# Patient Record
Sex: MALE | Race: BLACK OR AFRICAN AMERICAN | NOT HISPANIC OR LATINO | Employment: OTHER | URBAN - METROPOLITAN AREA
[De-identification: names, ages, dates, MRNs, and addresses within clinical notes are randomized per-mention and may not be internally consistent; named-entity substitution may affect disease eponyms.]

---

## 2017-02-08 ENCOUNTER — TELEPHONE (OUTPATIENT)
Dept: FAMILY MEDICINE | Facility: CLINIC | Age: 62
End: 2017-02-08

## 2017-03-10 ENCOUNTER — OFFICE VISIT (OUTPATIENT)
Dept: ENDOCRINOLOGY | Facility: CLINIC | Age: 62
End: 2017-03-10
Payer: MEDICAID

## 2017-03-10 VITALS
HEART RATE: 72 BPM | HEIGHT: 70 IN | WEIGHT: 213.31 LBS | BODY MASS INDEX: 30.54 KG/M2 | SYSTOLIC BLOOD PRESSURE: 110 MMHG | DIASTOLIC BLOOD PRESSURE: 72 MMHG

## 2017-03-10 DIAGNOSIS — D57.1 HB-SS DISEASE WITHOUT CRISIS: ICD-10-CM

## 2017-03-10 DIAGNOSIS — E66.9 OBESITY (BMI 30-39.9): ICD-10-CM

## 2017-03-10 PROCEDURE — 99213 OFFICE O/P EST LOW 20 MIN: CPT | Mod: PBBFAC,PO | Performed by: NURSE PRACTITIONER

## 2017-03-10 PROCEDURE — 99214 OFFICE O/P EST MOD 30 MIN: CPT | Mod: S$PBB,,, | Performed by: NURSE PRACTITIONER

## 2017-03-10 PROCEDURE — 99999 PR PBB SHADOW E&M-EST. PATIENT-LVL III: CPT | Mod: PBBFAC,,, | Performed by: NURSE PRACTITIONER

## 2017-03-10 RX ORDER — INSULIN PUMP SYRINGE, 3 ML
EACH MISCELLANEOUS
Qty: 1 EACH | Refills: 0 | Status: SHIPPED | OUTPATIENT
Start: 2017-03-10 | End: 2023-02-27

## 2017-03-10 RX ORDER — LANCETS
EACH MISCELLANEOUS
Qty: 200 EACH | Refills: 3 | Status: SHIPPED | OUTPATIENT
Start: 2017-03-10 | End: 2018-04-09 | Stop reason: SDUPTHER

## 2017-03-10 NOTE — PROGRESS NOTES
"CC: Mr. Royer Motley arrives today for management of Type 2 DM and review of chronic medical conditions, as listed in the visit diagnosis section of this encounter.     HPI: Mr. Royer Motley was diagnosed with Type 2 DM in 2004 during a hospitalization for a sickle cell crisis and metformin. He started taking Lantus and Novolog insulin in 2012 after an admission for DKA and SCC.  + FH of DM in both parents, 2 sisters.     Last seen in endocrine by CRUZITO Schaefer NP, in 12/2016. He reports being non compliant at the time of last visit, but now is taking his metformin as prescribed. He is currently holding his glimepiride due to better blood sugars.   He is new to me today.    He is accompanied by his wife, who provides most of the history.     BG readings are checked 3x/day. No logs to clinic. Reports most BG range 90-140s.    Hypoglycemia: No    Missing Insulin/PO medication doses: No    Exercise: Yes, gym 3x/week. Bike rides 2 days/week.    Dietary Habits: Eats 2-3 meals/day, occasionally skips lunch. Has cut out cookies. Drinks 2 glasses juice per day. .    Last DM education appointment: 9/2016      CURRENT DIABETIC MEDS: Glucophage XR 1000 mg BID  Glucometer type: brotips meter? He is asking for a new meter today.     Previous DM treatments:  Glimepiride  Lantus  Novolog    Last Eye Exam: never  Last Podiatry Exam: never    REVIEW OF SYSTEMS  Constitutional: no c/o fatigue, weakness, or weight loss.   Eyes: + blurry vision that waxes and wanes  Cardiac: no palpitations or chest pain.  Respiratory: no cough or dyspnea.  GI: no c/o abdominal pain or nausea. Denies h/o pancreatitis.   Skin: no lesions or rashes.  Neuro: no numbness, tingling, or parasthesias.  Endocrine: denies polyphagia, polydipsia, polyuria.      Personally reviewed Past Medical, Surgical, Social History.    Vital Signs  /72 (BP Location: Left arm, Patient Position: Sitting, BP Method: Manual)  Pulse 72  Ht 5' 10" (1.778 m)  Wt " 96.7 kg (213 lb 4.7 oz)  BMI 30.6 kg/m2    Personally reviewed the below labs:    No results found for: HGBA1C    Chemistry        Component Value Date/Time     09/09/2016 1113    K 4.7 09/09/2016 1113     09/09/2016 1113    CO2 24 09/09/2016 1113    BUN 15 09/09/2016 1113    CREATININE 1.5 (H) 09/09/2016 1113     (H) 09/09/2016 1113        Component Value Date/Time    CALCIUM 8.7 09/09/2016 1113    ALKPHOS 103 09/09/2016 1113    AST 17 09/09/2016 1113    ALT 13 09/09/2016 1113    BILITOT 2.3 (H) 09/09/2016 1113          Lab Results   Component Value Date    CHOL 115 (L) 09/09/2016    CHOL 101 (L) 10/14/2011     Lab Results   Component Value Date    HDL 30 (L) 09/09/2016    HDL 19 (L) 10/14/2011     Lab Results   Component Value Date    LDLCALC 60.0 (L) 09/09/2016    LDLCALC 45.2 (L) 10/14/2011     Lab Results   Component Value Date    TRIG 125 09/09/2016    TRIG 184 (H) 10/14/2011     Lab Results   Component Value Date    CHOLHDL 26.1 09/09/2016    CHOLHDL 18.8 (L) 10/14/2011       Lab Results   Component Value Date    MICALBCREAT 5.3 09/09/2016     Lab Results   Component Value Date    TSH 0.326 (L) 10/13/2011       CrCl cannot be calculated (Patient has no serum creatinine result on file.).    No results found for: [id number]      PHYSICAL EXAMINATION  Constitutional: Appears well, no distress  Neck: Supple, trachea midline; no thyromegaly or nodules.   Respiratory: CTA, even and unlabored.  Cardiovascular: RRR, no murmurs, no carotid bruits. DP pulses  2+ bilaterally; no edema.    Lymph: no cervical or supraclavicular lymphadenopathy  Skin: warm and dry; no lipohypertrophy, + acanthosis nigracans observed.  Neuro: no loss of protective sensation via 10 gm monofilament or vibratory exam bilaterally, DTR 2+ BUE/2+BLE.  Feet: no open wounds or callouses; appropriate footwear.    Assessment/Plan  1. Uncontrolled type 2 diabetes mellitus with diabetic polyneuropathy, without long-term current  use of insulin  -- No recent labs but pt reports being more compliant and not skipping doses of his metformin.   -- continue metformin  -- check BG 2x/day. Rx for meter, strips, lancets sent to pharmacy  -- needs to cut back on juice intake. Limit to only 6 oz/day.  Ambulatory referral to Ophthalmology    -- will order fructosamine to evaluate BG average. A1c not accurate with hemoglobinopathy    -- Discussed diagnosis of DM, A1c goals, progression of disease, long term complications and tx options.  Advised patient to check BG before activities, such as driving or exercise.  -- Reviewed hypoglycemia management: treat with 1/2 glass of juice, 1/2 can regular coke, or 4 glucose tablets. Monitor and repeat treatment every 15 minutes until BG is >70 Then have a snack, which includes a complex carbohydrate and protein.   2. Hb-SS disease without crisis  -- will avoid A1c and order fructosamine     3. Obesity (BMI 30-39.9)  -- increases insulin resistance        FOLLOW UP  Return in about 3 months (around 6/10/2017).   Patient instructed to bring BG logs to each follow up   Patient encouraged to call for any BG/medication issues, concerns, or questions.    Orders Placed This Encounter   Procedures    Basic metabolic panel    Fructosamine    Ambulatory referral to Ophthalmology

## 2017-03-10 NOTE — MR AVS SNAPSHOT
[facility]  [address]  [place] [address]  Phone: [phone]  Fax: [phone]                  Royer Motley   3/10/2017 1:00 PM   Office Visit    Description:  Male : 1955   Provider:  VERONIKA Casey FNP-C   Department:  [facility]           Reason for Visit     Diabetes           Diagnoses this Visit        Comments    Uncontrolled type 2 diabetes mellitus with diabetic polyneuropathy, without long-term current use of insulin    -  Primary     Hb-SS disease without crisis         Obesity (BMI 30-39.9)                To Do List           Future Appointments        Provider Department Dept Phone    2017 9:00 AM Ginger Hathaway MD [facility] - Ophthalmology [phone]    2017 10:05 AM LAB, [place] [facility] [phone]    2017 1:00 PM VERONIKA Casey FNP-C [facility] [phone]      Goals (5 Years of Data)     None      Follow-Up and Disposition     Return in about 3 months (around 6/10/2017).       These Medications        Disp Refills Start End    blood-glucose meter kit 1 each 0 3/10/2017 3/10/2018    Use as instructed    Pharmacy: [facility] [id number] [facility], [place] - [address] AT [address] [address] Ph #: [phone]       blood sugar diagnostic Strp 200 strip 3 3/10/2017     Check blood glucose 2 times a day. E11.42    Pharmacy: [facility] [id number]  [facility] [place] [facility] [address] [facility] [address] Ph #: [phone]       lancets Misc 200 each 3 3/10/2017     Check blood glucose 2x/day. e11.42    Pharmacy: [facility] [id number]  [facility] [place] [facility] [address] [facility] [address] Ph #: [phone]         [facility]     [facility] -  Assistance  Registered nurses in the [facility] provide clinical advisement, health  education, appointment booking, and other advisory services.  Call for this free service at [phone].             Medications           Message regarding Medications     Verify the changes and/or additions to your medication regime listed below are the same as discussed with your clinician today.  If any of these changes or additions are incorrect, please notify your healthcare provider.        CHANGE how you are taking these medications     Start Taking Instead of    blood sugar diagnostic Strp blood sugar diagnostic Strp    Dosage:  Check blood glucose 2 times a day. E11.42 Dosage:  Check blood glucose 4 times a day. E11.65    Reason for Change:  Reorder     lancets Misc lancets Misc    Dosage:  Check blood glucose 2x/day. e11.42 Dosage:  Check blood glucose 4x/day. e11.65    Reason for Change:  Reorder       STOP taking these medications     glimepiride (AMARYL) 4 MG tablet Take 1 tablet (4 mg total) by mouth before breakfast.           Verify that the below list of medications is an accurate representation of the medications you are currently taking.  If none reported, the list may be blank. If incorrect, please contact your healthcare provider. Carry this list with you in case of emergency.           Current Medications     blood sugar diagnostic Strp Check blood glucose 2 times a day. E11.42    blood-glucose meter kit Use as instructed    doxazosin (CARDURA) 8 MG Tab 8 mg every 12 (twelve) hours.     gabapentin (NEURONTIN) 300 MG capsule Take 1 capsule (300 mg total) by mouth every evening.    lancets Misc Check blood glucose 2x/day. e11.42    metformin (GLUCOPHAGE-XR) 500 MG 24 hr tablet Take 2 tablets (1,000 mg total) by mouth 2 (two) times daily with meals.    morphine (MS CONTIN) 15 MG 12 hr tablet Take 15 mg by mouth once daily.    oxybutynin (DITROPAN) 5 MG Tab TK 1 T PO  AT NIGHT TIME    oxycodone (ROXICODONE) 30 MG Tab TAKE 1 TABLET BY MOUTH 4 TIMES DAILY AS NEEDED for breakthrough pain          "  Clinical Reference Information           Your Vitals Were     BP Pulse Height Weight BMI    110/72 (BP Location: Left arm, Patient Position: Sitting, BP Method: Manual) 72 5' 10" (1.778 m) 96.7 kg (213 lb 4.7 oz) 30.6 kg/m2      Blood Pressure          Most Recent Value    BP  110/72      Allergies as of 3/10/2017     No Known Allergies      Immunizations Administered on Date of Encounter - 3/10/2017     None      Orders Placed During Today's Visit      Normal Orders This Visit    Ambulatory referral to Ophthalmology     HM DIABETES FOOT EXAM     Future Labs/Procedures Expected by Expires    Basic metabolic panel  3/10/2017 5/9/2018    Fructosamine  3/10/2017 5/9/2018      Language Assistance Services     ATTENTION: Language assistance services are available, free of charge. Please call [phone].      ATENCIÓN: Si miquella damon, tiene a benson disposición servicios gratuitos de asistencia lingüística. Llame al [phone].     CHÚ Ý: N?u b?n nói Ti?ng Vi?t, có các d?ch v? h? tr? ngôn ng? mi?n phí dành cho b?n. G?i s? [phone].         [facility] complies with applicable Federal civil rights laws and does not discriminate on the basis of race, color, national origin, age, disability, or sex.        "

## 2017-03-30 DIAGNOSIS — E11.9 TYPE 2 DIABETES MELLITUS WITHOUT COMPLICATION: ICD-10-CM

## 2017-03-31 RX ORDER — METFORMIN HYDROCHLORIDE 500 MG/1
TABLET, EXTENDED RELEASE ORAL
Qty: 360 TABLET | Refills: 2 | Status: SHIPPED | OUTPATIENT
Start: 2017-03-31 | End: 2017-07-11 | Stop reason: SDUPTHER

## 2017-04-24 ENCOUNTER — TELEPHONE (OUTPATIENT)
Dept: OPHTHALMOLOGY | Facility: CLINIC | Age: 62
End: 2017-04-24

## 2017-04-27 ENCOUNTER — TELEPHONE (OUTPATIENT)
Dept: OPHTHALMOLOGY | Facility: CLINIC | Age: 62
End: 2017-04-27

## 2017-07-11 ENCOUNTER — OFFICE VISIT (OUTPATIENT)
Dept: ENDOCRINOLOGY | Facility: CLINIC | Age: 62
End: 2017-07-11
Payer: MEDICAID

## 2017-07-11 ENCOUNTER — LAB VISIT (OUTPATIENT)
Dept: LAB | Facility: HOSPITAL | Age: 62
End: 2017-07-11
Payer: MEDICAID

## 2017-07-11 VITALS
HEIGHT: 70 IN | HEART RATE: 68 BPM | SYSTOLIC BLOOD PRESSURE: 118 MMHG | WEIGHT: 216.94 LBS | BODY MASS INDEX: 31.06 KG/M2 | DIASTOLIC BLOOD PRESSURE: 64 MMHG

## 2017-07-11 DIAGNOSIS — E66.9 OBESITY (BMI 30-39.9): ICD-10-CM

## 2017-07-11 DIAGNOSIS — D57.1 HB-SS DISEASE WITHOUT CRISIS: ICD-10-CM

## 2017-07-11 LAB
ANION GAP SERPL CALC-SCNC: 4 MMOL/L
BUN SERPL-MCNC: 13 MG/DL
CALCIUM SERPL-MCNC: 8.8 MG/DL
CHLORIDE SERPL-SCNC: 108 MMOL/L
CO2 SERPL-SCNC: 27 MMOL/L
CREAT SERPL-MCNC: 1.5 MG/DL
EST. GFR  (AFRICAN AMERICAN): 57.7 ML/MIN/1.73 M^2
EST. GFR  (NON AFRICAN AMERICAN): 49.9 ML/MIN/1.73 M^2
GLUCOSE SERPL-MCNC: 200 MG/DL
POTASSIUM SERPL-SCNC: 4.6 MMOL/L
SODIUM SERPL-SCNC: 139 MMOL/L

## 2017-07-11 PROCEDURE — 80048 BASIC METABOLIC PNL TOTAL CA: CPT

## 2017-07-11 PROCEDURE — 99214 OFFICE O/P EST MOD 30 MIN: CPT | Mod: S$PBB,,, | Performed by: NURSE PRACTITIONER

## 2017-07-11 PROCEDURE — 82985 ASSAY OF GLYCATED PROTEIN: CPT

## 2017-07-11 PROCEDURE — 99999 PR PBB SHADOW E&M-EST. PATIENT-LVL III: CPT | Mod: PBBFAC,,, | Performed by: NURSE PRACTITIONER

## 2017-07-11 NOTE — PATIENT INSTRUCTIONS
Glucerna shake can be used for meal replacement.       Snacks can be an important part of a balanced, healthy meal plan. They allow you to eat more frequently, feeling full and satisfied throughout the day. Also, they allow you to spread carbohydrates evenly, which may stabilize blood sugars.  Plus, snacks are enjoyable!     The amount of carbohydrate needed at snacks varies. Generally, about 15 grams of carbohydrate per snack is recommended.  Below you will find some tasty treats.       0-5 gm carb   Crystal Light   Vitamin Water Zero   Herbal tea, unsweetened   2 tsp peanut butter on celery   1./2 cup sugar-free jell-o   1 sugar-free popsicle   ¼ cup blueberries   8oz Blue Nidhi unsweetened almond milk   5 baby carrots & celery sticks, cucumbers, bell peppers dipped in ¼ cup salsa, 2Tbsp light ranch dressing or 2Tbsp plain Greek yogurt   10 Goldfish crackers   ½ oz low-fat cheese or string cheese   1 closed handful of nuts, unsalted   1 Tbsp of sunflower seeds, unsalted   1 cup Smart Pop popcorn   1 whole grain brown rice cake        15 gm carb   1 small piece of fruit or ½ banana or 1/2 cup lite canned fruit   3 kaylee cracker squares   3 cups Smart Pop popcorn, top spray butter, Islas lite salt or cinnamon and Truvia   5 Vanilla Wafers   ½ cup low fat, no added sugar ice cream or frozen yogurt (Blue bell, Blue Bunny, Weight Watchers, Skinny Cow)   ½ turkey, ham, or chicken sandwich   ½ c fruit with ½ c Cottage cheese   4-6 unsalted wheat crackers with 1 oz low fat cheese or 1 tbsp peanut butter    30-45 goldfish crackers (depending on flavor)    7-8 Buddhist mini brown rice cakes (caramel, apple cinnamon, chocolate)    12 Buddhist mini brown rice cakes (cheddar, bbq, ranch)    1/3 cup hummus dip with raw veg   1/2 whole wheat joyce, 1Tbsp hummus   Mini Pizza (1/2 whole wheat English muffin, low-fat  cheese, tomato sauce)   100 calorie snack pack (Oreo, Chips Ahoy, Ritz Mix, Baked  Cheetos)   4-6 oz. light or Greek Style yogurt (Chobani, Yoplait, Okios, [place]oneyfield)   ½ cup sugar-free pudding     6 in. wheat tortilla or joyce oven toasted chips (topped with spray butter flavoring, cinnamon, Truvia OR spray butter, garlic powder, chili powder)    18 BBQ Popchips (available at [employer], [employer], Fresh Market)

## 2017-07-11 NOTE — PROGRESS NOTES
"CC: Mr. Royer Motley arrives today for management of Type 2 DM and review of chronic medical conditions, as listed in the visit diagnosis section of this encounter.     HPI: Mr. Royer Motley was diagnosed with Type 2 DM in  during a hospitalization for a sickle cell crisis and metformin was initiated. He started taking Lantus and Novolog insulin in  after an admission for DKA and SCC.  This was later discontinued and glimepiride was added but he currently isn't taking. + FH of DM in both parents, 2 sisters.     He is accompanied by his wife, who provides most of the history.     BG readings are checked 3x/day. No logs or meter to clinic. Reports the following:  Fastin  Bedtime: 130-215    Hypoglycemia: No    Missing Insulin/PO medication doses: Occasionally     Exercise: Yes, gym 3x/week.     Dietary Habits: Eats 2 meals/day. Skips lunch. Snacks on chips, PB and jelly sandwich. Occasional Sprite.  B: egg biscuit or doughnut with milk  D: turkey hot dog with chili with Sprite.    Last DM education appointment: 2016      CURRENT DIABETIC MEDS: Glucophage XR 1000 mg BID  Glucometer type: unsure    Previous DM treatments:  Glimepiride  Lantus  Novolog    Last Eye Exam: never. Needs to reschedule  Last Podiatry Exam: never    REVIEW OF SYSTEMS  Constitutional: no c/o fatigue, weakness, or weight loss.   Eyes: denies visual disturbances.   Cardiac: no palpitations or chest pain.  Respiratory: no cough or dyspnea.  GI: no c/o abdominal pain or nausea. Denies h/o pancreatitis.   Skin: no lesions or rashes.  Neuro: no numbness, tingling, or parasthesias.  Endocrine: denies polyphagia, polydipsia, polyuria.      Personally reviewed Past Medical, Surgical, Social History.    Vital Signs  /64   Pulse 68   Ht 5' 10" (1.778 m)   Wt 98.4 kg (216 lb 14.9 oz)   BMI 31.13 kg/m²     Personally reviewed the below labs:    No results found for: HGBA1C    Chemistry        Component Value Date/Time    "  09/09/2016 1113    K 4.7 09/09/2016 1113     09/09/2016 1113    CO2 24 09/09/2016 1113    BUN 15 09/09/2016 1113    CREATININE 1.5 (H) 09/09/2016 1113     (H) 09/09/2016 1113        Component Value Date/Time    CALCIUM 8.7 09/09/2016 1113    ALKPHOS 103 09/09/2016 1113    AST 17 09/09/2016 1113    ALT 13 09/09/2016 1113    BILITOT 2.3 (H) 09/09/2016 1113          Lab Results   Component Value Date    CHOL 115 (L) 09/09/2016    CHOL 101 (L) 10/14/2011     Lab Results   Component Value Date    HDL 30 (L) 09/09/2016    HDL 19 (L) 10/14/2011     Lab Results   Component Value Date    LDLCALC 60.0 (L) 09/09/2016    LDLCALC 45.2 (L) 10/14/2011     Lab Results   Component Value Date    TRIG 125 09/09/2016    TRIG 184 (H) 10/14/2011     Lab Results   Component Value Date    CHOLHDL 26.1 09/09/2016    CHOLHDL 18.8 (L) 10/14/2011       Lab Results   Component Value Date    MICALBCREAT 5.3 09/09/2016     Lab Results   Component Value Date    TSH 0.326 (L) 10/13/2011       CrCl cannot be calculated (Patient's most recent sCr result is older than the maximum 7 days allowed.).    No results found for: [id number]      PHYSICAL EXAMINATION  Constitutional: Appears well, no distress  Neck: Supple, trachea midline; no thyromegaly or nodules.   Respiratory: CTA, even and unlabored.  Cardiovascular: RRR, no murmurs, no carotid bruits. DP pulses  2+ bilaterally; no edema.    Lymph: no cervical or supraclavicular lymphadenopathy  Skin: warm and dry; no lipohypertrophy, + acanthosis nigracans observed.  Neuro: diminished DTR to BUE/BLE. Previously, no loss of protective sensation via 10 gm monofilament or vibratory exam bilaterally  Feet: appropriate footwear.    Assessment/Plan  1. Uncontrolled type 2 diabetes mellitus with diabetic polyneuropathy, without long-term current use of insulin  -- No recent labs and no logs or meter brought to appointment. Occasional missed metformin doses and + dietary indiscretion.   --  labs today.  -- continue metformin and advised him not to miss doses.   -- check BG 2x/day.   -- recommended eating 3 meals/day and decrease snacking. Low carb snack list provided.   -- needs to schedule eye exam    -- will order fructosamine going forward. A1c not accurate with hemoglobinopathy    -- Discussed diagnosis of DM, A1c goals, progression of disease, long term complications and tx options.  Advised patient to check BG before activities, such as driving or exercise.  -- Reviewed hypoglycemia management: treat with 1/2 glass of juice, 1/2 can regular coke, or 4 glucose tablets. Monitor and repeat treatment every 15 minutes until BG is >70 Then have a snack, which includes a complex carbohydrate and protein.      -- not on statin. Aware LDL has been historically low. Recommended low dose aspirin if ok with hematology    2. Hb-SS disease without crisis  -- will avoid A1c and order fructosamine     3. Obesity (BMI 30-39.9)  -- increases insulin resistance   Body mass index is 31.13 kg/m².       FOLLOW UP  Return in about 4 months (around 11/11/2017).   Patient instructed to bring BG logs to each follow up   Patient encouraged to call for any BG/medication issues, concerns, or questions.    Orders Placed This Encounter   Procedures    Fructosamine    Comprehensive metabolic panel

## 2017-07-13 ENCOUNTER — PATIENT MESSAGE (OUTPATIENT)
Dept: ENDOCRINOLOGY | Facility: CLINIC | Age: 62
End: 2017-07-13

## 2017-07-13 LAB — FRUCTOSAMINE SERPL-SCNC: 334 UMOL /L (ref 0–285)

## 2017-09-14 DIAGNOSIS — Z12.11 COLON CANCER SCREENING: ICD-10-CM

## 2017-09-21 DIAGNOSIS — E11.9 TYPE 2 DIABETES MELLITUS WITHOUT COMPLICATION: ICD-10-CM

## 2017-10-14 DIAGNOSIS — E11.8 UNCONTROLLED TYPE 2 DIABETES MELLITUS WITH COMPLICATION, UNSPECIFIED LONG TERM INSULIN USE STATUS: ICD-10-CM

## 2017-10-14 DIAGNOSIS — E11.65 UNCONTROLLED TYPE 2 DIABETES MELLITUS WITH COMPLICATION, UNSPECIFIED LONG TERM INSULIN USE STATUS: ICD-10-CM

## 2017-10-16 RX ORDER — METFORMIN HYDROCHLORIDE 500 MG/1
TABLET, EXTENDED RELEASE ORAL
Qty: 360 TABLET | Refills: 0 | Status: SHIPPED | OUTPATIENT
Start: 2017-10-16 | End: 2018-01-10 | Stop reason: SDUPTHER

## 2017-11-09 ENCOUNTER — LAB VISIT (OUTPATIENT)
Dept: LAB | Facility: HOSPITAL | Age: 62
End: 2017-11-09
Attending: NURSE PRACTITIONER
Payer: MEDICAID

## 2017-11-09 LAB
ALBUMIN SERPL BCP-MCNC: 3.6 G/DL
ALP SERPL-CCNC: 92 U/L
ALT SERPL W/O P-5'-P-CCNC: 18 U/L
ANION GAP SERPL CALC-SCNC: 8 MMOL/L
AST SERPL-CCNC: 20 U/L
BILIRUB SERPL-MCNC: 2.1 MG/DL
BUN SERPL-MCNC: 13 MG/DL
CALCIUM SERPL-MCNC: 9 MG/DL
CHLORIDE SERPL-SCNC: 105 MMOL/L
CO2 SERPL-SCNC: 24 MMOL/L
CREAT SERPL-MCNC: 1.5 MG/DL
EST. GFR  (AFRICAN AMERICAN): 57.3 ML/MIN/1.73 M^2
EST. GFR  (NON AFRICAN AMERICAN): 49.5 ML/MIN/1.73 M^2
GLUCOSE SERPL-MCNC: 355 MG/DL
POTASSIUM SERPL-SCNC: 4.4 MMOL/L
PROT SERPL-MCNC: 7.9 G/DL
SODIUM SERPL-SCNC: 137 MMOL/L

## 2017-11-09 PROCEDURE — 36415 COLL VENOUS BLD VENIPUNCTURE: CPT | Mod: PO

## 2017-11-09 PROCEDURE — 80053 COMPREHEN METABOLIC PANEL: CPT

## 2017-11-09 PROCEDURE — 82985 ASSAY OF GLYCATED PROTEIN: CPT

## 2017-11-14 ENCOUNTER — OFFICE VISIT (OUTPATIENT)
Dept: ENDOCRINOLOGY | Facility: CLINIC | Age: 62
End: 2017-11-14
Payer: MEDICAID

## 2017-11-14 VITALS
WEIGHT: 221.44 LBS | SYSTOLIC BLOOD PRESSURE: 138 MMHG | BODY MASS INDEX: 31.7 KG/M2 | HEIGHT: 70 IN | HEART RATE: 60 BPM | DIASTOLIC BLOOD PRESSURE: 80 MMHG

## 2017-11-14 DIAGNOSIS — E66.9 OBESITY (BMI 30-39.9): ICD-10-CM

## 2017-11-14 DIAGNOSIS — N18.30 TYPE 2 DIABETES MELLITUS WITH STAGE 3 CHRONIC KIDNEY DISEASE, WITHOUT LONG-TERM CURRENT USE OF INSULIN: ICD-10-CM

## 2017-11-14 DIAGNOSIS — E11.22 TYPE 2 DIABETES MELLITUS WITH STAGE 3 CHRONIC KIDNEY DISEASE, WITHOUT LONG-TERM CURRENT USE OF INSULIN: ICD-10-CM

## 2017-11-14 DIAGNOSIS — D57.1 HB-SS DISEASE WITHOUT CRISIS: ICD-10-CM

## 2017-11-14 PROCEDURE — 99214 OFFICE O/P EST MOD 30 MIN: CPT | Mod: PBBFAC,PN | Performed by: NURSE PRACTITIONER

## 2017-11-14 PROCEDURE — 99999 PR PBB SHADOW E&M-EST. PATIENT-LVL IV: CPT | Mod: PBBFAC,,, | Performed by: NURSE PRACTITIONER

## 2017-11-14 PROCEDURE — 99214 OFFICE O/P EST MOD 30 MIN: CPT | Mod: S$PBB,,, | Performed by: NURSE PRACTITIONER

## 2017-11-14 RX ORDER — GLIMEPIRIDE 1 MG/1
1 TABLET ORAL
Qty: 30 TABLET | Refills: 11 | Status: SHIPPED | OUTPATIENT
Start: 2017-11-14 | End: 2019-05-31 | Stop reason: SDUPTHER

## 2017-11-14 NOTE — PROGRESS NOTES
"CC: Mr. Royer Motley arrives today for management of Type 2 DM and review of chronic medical conditions, as listed in the visit diagnosis section of this encounter.     HPI: Mr. Royer Motley was diagnosed with Type 2 DM in 2004 during a hospitalization for a sickle cell crisis and metformin was initiated. He started taking Lantus and Novolog insulin in 2012 after an admission for DKA and SCC.  This was later discontinued and glimepiride was added but he currently isn't taking. + FH of DM in both parents, 2 sisters. Hgb A1c has proven inaccurate with hemoglobinopathy.     He is accompanied by his wife, who provides most of the history.    He reports an improvement in taking his medication as prescribed.     BG readings are checked 1-2x/day. Brings log.              Hypoglycemia: No    Missing Insulin/PO medication doses: Occasionally     Exercise: Yes, gym 3x/week. Walking treadmill, weights.     Dietary Habits: Eats 3 meals/day. Has decreased snacking. Rare Sprite.    Last DM education appointment: 9/2016      CURRENT DIABETIC MEDS: Glucophage XR 1000 mg BID  Glucometer type: unsure    Previous DM treatments:  Glimepiride  Lantus  Novolog    Last Eye Exam: never. Needs to reschedule   Last Podiatry Exam: never    REVIEW OF SYSTEMS  Constitutional: no c/o fatigue, weakness, or weight loss. + weight gain  Eyes: denies visual disturbances.   Cardiac: no palpitations or chest pain.   Respiratory: no cough or dyspnea.  GI: no c/o abdominal pain or nausea. Denies h/o pancreatitis.   Skin: no lesions or rashes.  Neuro: no numbness, tingling, or parasthesias.  Endocrine: denies polyphagia, polydipsia, polyuria.      Personally reviewed Past Medical, Surgical, Social History.    Vital Signs  /80   Pulse 60   Ht 5' 10" (1.778 m)   Wt 100.4 kg (221 lb 7.2 oz)   BMI 31.78 kg/m²     Personally reviewed the below labs:    No results found for: HGBA1C    Chemistry        Component Value Date/Time     " 11/09/2017 1338    K 4.4 11/09/2017 1338     11/09/2017 1338    CO2 24 11/09/2017 1338    BUN 13 11/09/2017 1338    CREATININE 1.5 (H) 11/09/2017 1338     (H) 11/09/2017 1338        Component Value Date/Time    CALCIUM 9.0 11/09/2017 1338    ALKPHOS 92 11/09/2017 1338    AST 20 11/09/2017 1338    ALT 18 11/09/2017 1338    BILITOT 2.1 (H) 11/09/2017 1338          Lab Results   Component Value Date    CHOL 115 (L) 09/09/2016    CHOL 101 (L) 10/14/2011     Lab Results   Component Value Date    HDL 30 (L) 09/09/2016    HDL 19 (L) 10/14/2011     Lab Results   Component Value Date    LDLCALC 60.0 (L) 09/09/2016    LDLCALC 45.2 (L) 10/14/2011     Lab Results   Component Value Date    TRIG 125 09/09/2016    TRIG 184 (H) 10/14/2011     Lab Results   Component Value Date    CHOLHDL 26.1 09/09/2016    CHOLHDL 18.8 (L) 10/14/2011       Lab Results   Component Value Date    MICALBCREAT 5.3 09/09/2016     Lab Results   Component Value Date    TSH 0.326 (L) 10/13/2011       Estimated Creatinine Clearance: 61.4 mL/min (based on SCr of 1.5 mg/dL (H)).    No results found for: [id number]      PHYSICAL EXAMINATION  Constitutional: Appears well, no distress  Neck: Supple, trachea midline; no thyromegaly or nodules.   Respiratory: CTA, even and unlabored.  Cardiovascular: RRR, no murmurs, no carotid bruits. DP pulses  2+ bilaterally; no edema.    Lymph: no cervical or supraclavicular lymphadenopathy  Skin: warm and dry; no lipohypertrophy, + acanthosis nigracans observed.  Neuro: diminished DTR to BUE/BLE. Previously, no loss of protective sensation via 10 gm monofilament or vibratory exam bilaterally  Feet: appropriate footwear.    Assessment/Plan  1. Uncontrolled type 2 diabetes mellitus with diabetic polyneuropathy, without long-term current use of insulin  -- Fructosamine pending. Random glucose 355 on lab.  Most glucoses elevated on logs   -- Recommended Januvia but pt asking for glimepiride, as he has taken this  before.    -- start glimepiride 1 mg with breakfast. Discussed medication's mechanism of action, side effects, and contraindications.   -- continue metformin   -- check BG 2x/day.   -- schedule eye exam    -- Discussed diagnosis of DM, A1c goals, progression of disease, long term complications and tx options.  Advised patient to check BG before activities, such as driving or exercise.  -- Reviewed hypoglycemia management: treat with 1/2 glass of juice, 1/2 can regular coke, or 4 glucose tablets. Monitor and repeat treatment every 15 minutes until BG is >70 Then have a snack, which includes a complex carbohydrate and protein.      -- not on statin. Aware LDL has been historically low. Low dose aspirin if ok with hematology    2. Type 2 diabetes mellitus with stage 3 chronic kidney disease, without long term use of insulin  -- avoid insulin stacking, hypoglycemia.    3. Hb-SS disease without crisis  -- will avoid A1c and order fructosamine     4. Obesity (BMI 30-39.9)  -- increases insulin resistance   Body mass index is 31.78 kg/m².       FOLLOW UP  Return in about 3 months (around 2/14/2018).   Patient instructed to bring BG logs to each follow up   Patient encouraged to call for any BG/medication issues, concerns, or questions.    Orders Placed This Encounter   Procedures    Fructosamine    Basic metabolic panel    Microalbumin/creatinine urine ratio

## 2017-11-14 NOTE — PATIENT INSTRUCTIONS
Hypoglycemia (Low Blood Sugar)     Fast-acting sugar includes a cup of nonfat milk.     Too little sugar (glucose) in your blood is called hypoglycemia or low blood sugar. Low blood sugar usually means anything lower than 70 mg/dL. Talk with your healthcare provider about your target range and what level is too low for you. Diabetes itself doesnt cause low blood sugar. But some of the treatments for diabetes, such as pills or insulin, may raise your risk for it. Low blood sugar may cause you to pass out or have a seizure. So always treat low blood sugar right away, but don't overeat.  Special note: Always carry a source of fast-acting sugar and a snack in case of hypoglycemia.   What you may notice  If you have low blood sugar, you may have one or more of these symptoms:  · Shakiness or dizziness  · Cold, clammy skin or sweating  · Feelings of hunger  · Headache  · Nervousness  · A hard, fast heartbeat  · Weakness  · Confusion or irritability  · Blurred vision  · Having nightmares or waking up confused or sweating  · Numbness or tingling in the lips or tongue  What you should do  Here are tips to follow if you have hypoglycemia:   · First check your blood sugar. If it is too low (out of your target range), eat or drink 15 to 20 grams of fast-acting sugar. This may be 3 to 4 glucose tablets, 4 ounces (half a cup) of fruit juice or regular (nondiet) soda, 8 ounces (1 cup) of fat-free milk, or 1 tablespoon of honey. Dont take more than this, or your blood sugar may go too high.  · Wait 15 minutes. Then recheck your blood sugar if you can.  · If your blood sugar is still too low, repeat the steps above and check your blood sugar again. If your blood sugar still has not returned to your target range, contact your healthcare provider or seek emergency care.  · Once your blood sugar returns to target range, eat a snack or meal.  Preventing low blood sugar  Things you can do include the following:   · If your condition  needs a strict treatment plan, eat your meals and snacks at the same times each day. Dont skip meals!  · If your treatment plan lets you change when you eat and what you eat, learn how to change the time and dose of your rapid-acting insulin to match this.   · Ask your healthcare provider if it is safe for you to drink alcohol. Never drink on an empty stomach.  · Take your medicine at the prescribed times.  · Always carry a source of fast-acting sugar and a snack when youre away from home.  Other things to do  Additional tips include the following:  · Carry a medical ID card, a compact USB drive, or wear a medical alert bracelet or necklace. It should say that you have diabetes. It should also say what to do if you pass out or have a seizure.  · Make sure your family, friends, and coworkers know the signs of low blood sugar. Tell them what to do if your blood sugar falls very low and you cant treat yourself.  · Keep a glucagon emergency kit handy. Be sure your family, friends, and coworkers know how and when to use it. Check it regularly and replace the glucagon before it expires.  · Talk with your health care team about other things you can do to prevent low blood sugar.     If you have unexplained hypoglycemia or hypoglycemia several times, call your healthcare provider.   Date Last Reviewed: 5/1/2016  © 2659-4551 [employer]. [address] [place] [address], [place] [address]. All rights reserved. This information is not intended as a substitute for professional medical care. Always follow your healthcare professional's instructions.

## 2017-11-16 ENCOUNTER — PATIENT MESSAGE (OUTPATIENT)
Dept: ENDOCRINOLOGY | Facility: CLINIC | Age: 62
End: 2017-11-16

## 2017-11-16 LAB — FRUCTOSAMINE SERPL-SCNC: 359 UMOL /L (ref 0–285)

## 2018-01-10 DIAGNOSIS — E11.8 UNCONTROLLED TYPE 2 DIABETES MELLITUS WITH COMPLICATION, UNSPECIFIED LONG TERM INSULIN USE STATUS: ICD-10-CM

## 2018-01-10 DIAGNOSIS — E11.65 UNCONTROLLED TYPE 2 DIABETES MELLITUS WITH COMPLICATION, UNSPECIFIED LONG TERM INSULIN USE STATUS: ICD-10-CM

## 2018-01-11 RX ORDER — METFORMIN HYDROCHLORIDE 500 MG/1
TABLET, EXTENDED RELEASE ORAL
Qty: 360 TABLET | Refills: 0 | Status: SHIPPED | OUTPATIENT
Start: 2018-01-11 | End: 2018-04-07 | Stop reason: SDUPTHER

## 2018-04-07 DIAGNOSIS — E11.65 UNCONTROLLED TYPE 2 DIABETES MELLITUS WITH COMPLICATION, UNSPECIFIED LONG TERM INSULIN USE STATUS: ICD-10-CM

## 2018-04-07 DIAGNOSIS — E11.8 UNCONTROLLED TYPE 2 DIABETES MELLITUS WITH COMPLICATION, UNSPECIFIED LONG TERM INSULIN USE STATUS: ICD-10-CM

## 2018-04-08 RX ORDER — METFORMIN HYDROCHLORIDE 500 MG/1
TABLET, EXTENDED RELEASE ORAL
Qty: 360 TABLET | Refills: 0 | Status: SHIPPED | OUTPATIENT
Start: 2018-04-08 | End: 2018-07-09 | Stop reason: SDUPTHER

## 2018-04-09 RX ORDER — LANCETS
EACH MISCELLANEOUS
Qty: 200 EACH | Refills: 2 | Status: SHIPPED | OUTPATIENT
Start: 2018-04-09 | End: 2019-01-06 | Stop reason: SDUPTHER

## 2018-07-09 RX ORDER — METFORMIN HYDROCHLORIDE 500 MG/1
TABLET, EXTENDED RELEASE ORAL
Qty: 360 TABLET | Refills: 0 | Status: SHIPPED | OUTPATIENT
Start: 2018-07-09 | End: 2018-10-10 | Stop reason: SDUPTHER

## 2018-10-10 NOTE — PROGRESS NOTES
Refill Authorization Note     is requesting a refill authorization.    Brief assessment and rationale for refill: APPROVE; Needs Labs/Needs Appt  Amount/Quantity of medication ordered: 90d  Date of last appointment: 10/13/2016     Refills Authorized: Yes  If authorized number of refills: 0        Medication-related problems identified:   Requires labs  Requires appointment  Medication Therapy Plan: Hx of [id number]; A1C-no lab values; NTBO(A1C/Lipids/CMP/B12); Needs to Est. with New Care Team; Approve 3 more months   Name and strength of medication: metFORMIN (GLUCOPHAGE-XR) 500 MG 24 hr tablet  How patient will take medication: t2t po bid  Medication reconciliation completed: No        Comments: Needs to Est. with New Care Team      No results found for: HGBA1C      No results found for: LABA1C      Lab Results   Component Value Date    CREATININE 1.5 (H) 11/09/2017    BUN 13 11/09/2017     11/09/2017    K 4.4 11/09/2017     11/09/2017    CO2 24 11/09/2017         No results found for: [id number]

## 2018-10-11 RX ORDER — METFORMIN HYDROCHLORIDE 500 MG/1
TABLET, EXTENDED RELEASE ORAL
Qty: 360 TABLET | Refills: 0 | Status: SHIPPED | OUTPATIENT
Start: 2018-10-11 | End: 2019-05-31 | Stop reason: SDUPTHER

## 2018-10-11 NOTE — TELEPHONE ENCOUNTER
I have reviewed and agree with the assessment below. 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  Will reorder  Thanks    Please schedule pt for (Lipids/CMP/b12/fructosamine) and establishing appt

## 2018-10-16 NOTE — TELEPHONE ENCOUNTER
Called pt regarding below message. Informed pt of mediation refill. Informed pt of need for est care appt and labs. Scheduled est care appt. Appt date, time, and location given. Pt states he will walk in for labs at his convenience. Pt verbalized understanding with no further questions.

## 2018-11-14 DIAGNOSIS — Z12.11 COLON CANCER SCREENING: Primary | ICD-10-CM

## 2019-01-07 RX ORDER — LANCETS 33 GAUGE
EACH MISCELLANEOUS
Qty: 100 EACH | Refills: 0 | Status: SHIPPED | OUTPATIENT
Start: 2019-01-07

## 2019-05-16 ENCOUNTER — PATIENT OUTREACH (OUTPATIENT)
Dept: ADMINISTRATIVE | Facility: HOSPITAL | Age: 64
End: 2019-05-16

## 2019-05-17 ENCOUNTER — PATIENT MESSAGE (OUTPATIENT)
Dept: ADMINISTRATIVE | Facility: HOSPITAL | Age: 64
End: 2019-05-17

## 2019-05-30 ENCOUNTER — TELEPHONE (OUTPATIENT)
Dept: FAMILY MEDICINE | Facility: CLINIC | Age: 64
End: 2019-05-30

## 2019-05-30 ENCOUNTER — DOCUMENTATION ONLY (OUTPATIENT)
Dept: FAMILY MEDICINE | Facility: CLINIC | Age: 64
End: 2019-05-30

## 2019-05-30 NOTE — PROGRESS NOTES
Pre-Visit Chart Review  For Appointment Scheduled on 5/31/19    Health Maintenance Due   Topic Date Due    Eye Exam  09/27/1966    TETANUS VACCINE  09/27/1974    Low Dose Statin  09/27/1977    Colonoscopy  09/27/2006    Hemoglobin A1c  03/09/2017    Lipid Panel  09/09/2017    Urine Microalbumin  09/09/2017    Pneumococcal Vaccine (Highest Risk) (2 of 3 - PCV13) 10/13/2017    Foot Exam  03/10/2018

## 2019-05-31 ENCOUNTER — CLINICAL SUPPORT (OUTPATIENT)
Dept: FAMILY MEDICINE | Facility: CLINIC | Age: 64
End: 2019-05-31
Attending: FAMILY MEDICINE
Payer: MEDICAID

## 2019-05-31 ENCOUNTER — OFFICE VISIT (OUTPATIENT)
Dept: FAMILY MEDICINE | Facility: CLINIC | Age: 64
End: 2019-05-31
Payer: MEDICAID

## 2019-05-31 ENCOUNTER — DOCUMENTATION ONLY (OUTPATIENT)
Dept: FAMILY MEDICINE | Facility: CLINIC | Age: 64
End: 2019-05-31

## 2019-05-31 VITALS
HEART RATE: 64 BPM | SYSTOLIC BLOOD PRESSURE: 112 MMHG | WEIGHT: 207.88 LBS | HEIGHT: 70 IN | BODY MASS INDEX: 29.76 KG/M2 | OXYGEN SATURATION: 97 % | TEMPERATURE: 98 F | DIASTOLIC BLOOD PRESSURE: 72 MMHG

## 2019-05-31 DIAGNOSIS — G62.9 NEUROPATHY: ICD-10-CM

## 2019-05-31 DIAGNOSIS — Z12.11 COLON CANCER SCREENING: ICD-10-CM

## 2019-05-31 DIAGNOSIS — H91.90 HEARING LOSS, UNSPECIFIED HEARING LOSS TYPE, UNSPECIFIED LATERALITY: ICD-10-CM

## 2019-05-31 DIAGNOSIS — E11.9 TYPE 2 DIABETES MELLITUS WITHOUT COMPLICATION, WITHOUT LONG-TERM CURRENT USE OF INSULIN: Primary | ICD-10-CM

## 2019-05-31 DIAGNOSIS — E11.9 TYPE 2 DIABETES MELLITUS WITHOUT COMPLICATION, WITHOUT LONG-TERM CURRENT USE OF INSULIN: ICD-10-CM

## 2019-05-31 PROCEDURE — 99215 OFFICE O/P EST HI 40 MIN: CPT | Mod: PBBFAC,PO | Performed by: FAMILY MEDICINE

## 2019-05-31 PROCEDURE — 99214 OFFICE O/P EST MOD 30 MIN: CPT | Mod: S$PBB,,, | Performed by: FAMILY MEDICINE

## 2019-05-31 PROCEDURE — 99999 PR PBB SHADOW E&M-EST. PATIENT-LVL V: CPT | Mod: PBBFAC,,, | Performed by: FAMILY MEDICINE

## 2019-05-31 PROCEDURE — 99999 PR PBB SHADOW E&M-EST. PATIENT-LVL V: ICD-10-PCS | Mod: PBBFAC,,, | Performed by: FAMILY MEDICINE

## 2019-05-31 PROCEDURE — 92250 DIABETIC EYE SCREENING PHOTO: ICD-10-PCS | Mod: 26,S$PBB,, | Performed by: OPTOMETRIST

## 2019-05-31 PROCEDURE — 92250 FUNDUS PHOTOGRAPHY W/I&R: CPT | Mod: PBBFAC,PO

## 2019-05-31 PROCEDURE — 92250 FUNDUS PHOTOGRAPHY W/I&R: CPT | Mod: 26,S$PBB,, | Performed by: OPTOMETRIST

## 2019-05-31 PROCEDURE — 99214 PR OFFICE/OUTPT VISIT, EST, LEVL IV, 30-39 MIN: ICD-10-PCS | Mod: S$PBB,,, | Performed by: FAMILY MEDICINE

## 2019-05-31 RX ORDER — METFORMIN HYDROCHLORIDE 500 MG/1
1000 TABLET, EXTENDED RELEASE ORAL 2 TIMES DAILY WITH MEALS
Qty: 360 TABLET | Refills: 3 | Status: SHIPPED | OUTPATIENT
Start: 2019-05-31 | End: 2020-08-10

## 2019-05-31 RX ORDER — GLIMEPIRIDE 1 MG/1
1 TABLET ORAL
Qty: 90 TABLET | Refills: 3 | Status: SHIPPED | OUTPATIENT
Start: 2019-05-31 | End: 2020-08-10

## 2019-05-31 RX ORDER — GABAPENTIN 300 MG/1
300 CAPSULE ORAL NIGHTLY
Qty: 90 CAPSULE | Refills: 3 | Status: SHIPPED | OUTPATIENT
Start: 2019-05-31 | End: 2021-08-25 | Stop reason: SDUPTHER

## 2019-05-31 NOTE — PROGRESS NOTES
Royer Motley is a 63 y.o. male here for a diabetic eye screening with non-dilated fundus photos per Dr. Agarwal.    Patient cooperative?: yes  Small pupils?: yes   Last eye exam: patient rodriguez not know.     For exam results, see Encounter Report.

## 2019-05-31 NOTE — PROGRESS NOTES
Subjective:   Patient ID: Royer Motley is a 63 y.o. male     Chief Complaint:Establish Care      Patient here for establish care appointment.  Patient does endorse having hearing loss issues going on for past several months.  Otherwise patient doing well overall with no complaints.    Review of Systems   Constitutional: Negative for activity change and unexpected weight change.   HENT: Positive for hearing loss. Negative for rhinorrhea and trouble swallowing.    Eyes: Negative for discharge and visual disturbance.   Respiratory: Negative for chest tightness and wheezing.    Cardiovascular: Negative for chest pain and palpitations.   Gastrointestinal: Negative for blood in stool, constipation, diarrhea and vomiting.   Endocrine: Negative for polydipsia and polyuria.   Genitourinary: Positive for difficulty urinating and urgency. Negative for hematuria.   Musculoskeletal: Negative for arthralgias, joint swelling and neck pain.   Neurological: Negative for weakness and headaches.   Psychiatric/Behavioral: Negative for confusion and dysphoric mood.     Past Medical History:   Diagnosis Date    Avascular necrosis of hip     Diabetes mellitus, type 2     Enlarged prostate     Sickle cell hemoglobin C disease      Objective:     Vitals:    05/31/19 1317   BP: 112/72   Pulse: 64   Temp: 97.9 °F (36.6 °C)     Body mass index is 29.83 kg/m².  Physical Exam   Neck: Neck supple. No tracheal deviation present.   Cardiovascular: Normal rate, regular rhythm and normal heart sounds.   Pulmonary/Chest: Effort normal. No respiratory distress.   Musculoskeletal: Normal range of motion. He exhibits no edema.     Assessment:     1. Type 2 diabetes mellitus without complication, without long-term current use of insulin    2. Colon cancer screening    3. Uncontrolled type 2 diabetes mellitus with complication    4. Uncontrolled type 2 diabetes mellitus with diabetic polyneuropathy, without long-term current use of insulin    5.  Neuropathy    6. Hearing loss, unspecified hearing loss type, unspecified laterality      Plan:   Type 2 diabetes mellitus without complication, without long-term current use of insulin  -     Hemoglobin A1c; Future; Expected date: 05/31/2019  -     Lipid panel; Future; Expected date: 05/31/2019  -     Comprehensive metabolic panel; Future; Expected date: 05/31/2019  -     CBC auto differential; Future; Expected date: 05/31/2019  -     MICROALBUMIN / CREATININE RATIO URINE; Future; Expected date: 05/31/2019  -     Diabetic Eye Screening Photo; Future    Colon cancer screening  -     Fecal Immunochemical Test (iFOBT); Future; Expected date: 11/20/2019    Uncontrolled type 2 diabetes mellitus with complication  -     metFORMIN (GLUCOPHAGE-XR) 500 MG 24 hr tablet; Take 2 tablets (1,000 mg total) by mouth 2 (two) times daily with meals.  Dispense: 360 tablet; Refill: 3  -     Ambulatory referral to Optometry    Uncontrolled type 2 diabetes mellitus with diabetic polyneuropathy, without long-term current use of insulin  -     glimepiride (AMARYL) 1 MG tablet; Take 1 tablet (1 mg total) by mouth daily with breakfast.  Dispense: 90 tablet; Refill: 3  -     blood sugar diagnostic Strp; Apply 1 strip topically once daily. Check blood glucose 2 times a day. E11.42  Dispense: 90 strip; Refill: 3    Neuropathy  -     gabapentin (NEURONTIN) 300 MG capsule; Take 1 capsule (300 mg total) by mouth every evening.  Dispense: 90 capsule; Refill: 3    Hearing loss, unspecified hearing loss type, unspecified laterality  -     Ambulatory referral to ENT  -     Ambulatory Referral to Audiology            Time spent with patient: 30 minutes and over half of that time was spent on counseling an coordination of care.    Nikko Agarwal MD  05/31/2019    Portions of this note have been dictated with MARÍA ELENA Patterson

## 2019-06-03 ENCOUNTER — LAB VISIT (OUTPATIENT)
Dept: LAB | Facility: HOSPITAL | Age: 64
End: 2019-06-03
Attending: FAMILY MEDICINE
Payer: MEDICAID

## 2019-06-03 DIAGNOSIS — E11.9 TYPE 2 DIABETES MELLITUS WITHOUT COMPLICATION, WITHOUT LONG-TERM CURRENT USE OF INSULIN: ICD-10-CM

## 2019-06-03 LAB
ALBUMIN SERPL BCP-MCNC: 3.7 G/DL (ref 3.5–5.2)
ALP SERPL-CCNC: 97 U/L (ref 55–135)
ALT SERPL W/O P-5'-P-CCNC: 22 U/L (ref 10–44)
ANION GAP SERPL CALC-SCNC: 6 MMOL/L (ref 8–16)
AST SERPL-CCNC: 22 U/L (ref 10–40)
BASOPHILS # BLD AUTO: 0.07 K/UL (ref 0–0.2)
BASOPHILS NFR BLD: 0.7 % (ref 0–1.9)
BILIRUB SERPL-MCNC: 2.4 MG/DL (ref 0.1–1)
BUN SERPL-MCNC: 13 MG/DL (ref 8–23)
CALCIUM SERPL-MCNC: 9.3 MG/DL (ref 8.7–10.5)
CHLORIDE SERPL-SCNC: 107 MMOL/L (ref 95–110)
CHOLEST SERPL-MCNC: 126 MG/DL (ref 120–199)
CHOLEST/HDLC SERPL: 3.4 {RATIO} (ref 2–5)
CO2 SERPL-SCNC: 22 MMOL/L (ref 23–29)
CREAT SERPL-MCNC: 1.5 MG/DL (ref 0.5–1.4)
DIFFERENTIAL METHOD: ABNORMAL
EOSINOPHIL # BLD AUTO: 0.3 K/UL (ref 0–0.5)
EOSINOPHIL NFR BLD: 3.1 % (ref 0–8)
ERYTHROCYTE [DISTWIDTH] IN BLOOD BY AUTOMATED COUNT: 15.7 % (ref 11.5–14.5)
EST. GFR  (AFRICAN AMERICAN): 56.9 ML/MIN/1.73 M^2
EST. GFR  (NON AFRICAN AMERICAN): 49.2 ML/MIN/1.73 M^2
ESTIMATED AVG GLUCOSE: 148 MG/DL (ref 68–131)
GLUCOSE SERPL-MCNC: 282 MG/DL (ref 70–110)
HBA1C MFR BLD HPLC: 6.8 % (ref 4–5.6)
HCT VFR BLD AUTO: 35.6 % (ref 40–54)
HDLC SERPL-MCNC: 37 MG/DL (ref 40–75)
HDLC SERPL: 29.4 % (ref 20–50)
HGB BLD-MCNC: 12.4 G/DL (ref 14–18)
IMM GRANULOCYTES # BLD AUTO: 0.02 K/UL (ref 0–0.04)
IMM GRANULOCYTES NFR BLD AUTO: 0.2 % (ref 0–0.5)
LDLC SERPL CALC-MCNC: 69.4 MG/DL (ref 63–159)
LYMPHOCYTES # BLD AUTO: 2.2 K/UL (ref 1–4.8)
LYMPHOCYTES NFR BLD: 20.4 % (ref 18–48)
MCH RBC QN AUTO: 27.4 PG (ref 27–31)
MCHC RBC AUTO-ENTMCNC: 34.8 G/DL (ref 32–36)
MCV RBC AUTO: 79 FL (ref 82–98)
MONOCYTES # BLD AUTO: 0.8 K/UL (ref 0.3–1)
MONOCYTES NFR BLD: 7.8 % (ref 4–15)
NEUTROPHILS # BLD AUTO: 7.3 K/UL (ref 1.8–7.7)
NEUTROPHILS NFR BLD: 67.8 % (ref 38–73)
NONHDLC SERPL-MCNC: 89 MG/DL
NRBC BLD-RTO: 3 /100 WBC
PLATELET # BLD AUTO: 263 K/UL (ref 150–350)
PMV BLD AUTO: 11.4 FL (ref 9.2–12.9)
POTASSIUM SERPL-SCNC: 4.6 MMOL/L (ref 3.5–5.1)
PROT SERPL-MCNC: 7.6 G/DL (ref 6–8.4)
RBC # BLD AUTO: 4.52 M/UL (ref 4.6–6.2)
SODIUM SERPL-SCNC: 135 MMOL/L (ref 136–145)
TRIGL SERPL-MCNC: 98 MG/DL (ref 30–150)
WBC # BLD AUTO: 10.7 K/UL (ref 3.9–12.7)

## 2019-06-03 PROCEDURE — 85025 COMPLETE CBC W/AUTO DIFF WBC: CPT

## 2019-06-03 PROCEDURE — 80053 COMPREHEN METABOLIC PANEL: CPT

## 2019-06-03 PROCEDURE — 83036 HEMOGLOBIN GLYCOSYLATED A1C: CPT

## 2019-06-03 PROCEDURE — 80061 LIPID PANEL: CPT

## 2019-06-03 PROCEDURE — 36415 COLL VENOUS BLD VENIPUNCTURE: CPT | Mod: PO

## 2019-06-04 ENCOUNTER — TELEPHONE (OUTPATIENT)
Dept: OPHTHALMOLOGY | Facility: CLINIC | Age: 64
End: 2019-06-04

## 2019-06-04 NOTE — TELEPHONE ENCOUNTER
Called patient regarding diabetic eye screening results ; gave patient the results and he will follow up with his primary eye doctor      ----- Message from Griselda Rahman MA sent at 6/4/2019 10:00 AM CDT -----      ----- Message -----  From: CRUZITO Bolaños OD  Sent: 6/3/2019   8:05 AM  To: [facility]abetic Eyecam Schedul[facility]    Macula is obscured in both eyes' images due to shadowing. Advise dilated exam in 2-3 months.

## 2019-08-13 ENCOUNTER — PATIENT OUTREACH (OUTPATIENT)
Dept: ADMINISTRATIVE | Facility: HOSPITAL | Age: 64
End: 2019-08-13

## 2019-08-13 NOTE — LETTER
August 13, 2019    Royer Motley  [address]  [place] [address]             [facility]  [address]  [facility] [address]  Phone: [phone] Royer LYNN Tiesha  [address]  [place] [address]        Dear, Royer Motley      [facility] is committed to your overall health.  To help you get the most out of each of your visits, we will review your information to make sure you are up to date on all of your recommended tests and/or procedures.      Dr. Nikko Agarwal MD  has found that your chart shows you may be due for     DIABETIC FOOT EXAM    If you have had any of the above done at another facility, please bring the records or information with you so that your record at [facility] will be complete.  If you would like to schedule any of these, please contact the clinic at [phone].    If you are currently taking medication, please bring it with you to your appointment for review.    Also, if you have any type of Advanced Directives, please bring them with you to your office visit so we may scan them into your chart.    Thank You,    Your [facility] Team,  MD Tete Ortiz,  Panel Care Coordinator  [facility] [address] [place] [address]  Phone [phone]  Fax [phone]

## 2019-08-23 ENCOUNTER — DOCUMENTATION ONLY (OUTPATIENT)
Dept: FAMILY MEDICINE | Facility: CLINIC | Age: 64
End: 2019-08-23

## 2019-08-23 NOTE — PROGRESS NOTES
Pre-Visit Chart Review  For Appointment Scheduled on 8/27/19    Health Maintenance Due   Topic Date Due    TETANUS VACCINE  09/27/1974    Low Dose Statin  09/27/1977    Pneumococcal Vaccine (Highest Risk) (2 of 3 - PCV13) 10/13/2017    Foot Exam  03/10/2018

## 2019-08-27 ENCOUNTER — OFFICE VISIT (OUTPATIENT)
Dept: FAMILY MEDICINE | Facility: CLINIC | Age: 64
End: 2019-08-27
Payer: MEDICAID

## 2019-08-27 VITALS
TEMPERATURE: 98 F | BODY MASS INDEX: 30.27 KG/M2 | OXYGEN SATURATION: 96 % | SYSTOLIC BLOOD PRESSURE: 130 MMHG | WEIGHT: 211.44 LBS | DIASTOLIC BLOOD PRESSURE: 80 MMHG | HEIGHT: 70 IN | HEART RATE: 56 BPM

## 2019-08-27 DIAGNOSIS — E11.22 TYPE 2 DIABETES MELLITUS WITH STAGE 3 CHRONIC KIDNEY DISEASE, WITHOUT LONG-TERM CURRENT USE OF INSULIN: Primary | ICD-10-CM

## 2019-08-27 DIAGNOSIS — N18.30 TYPE 2 DIABETES MELLITUS WITH STAGE 3 CHRONIC KIDNEY DISEASE, WITHOUT LONG-TERM CURRENT USE OF INSULIN: Primary | ICD-10-CM

## 2019-08-27 DIAGNOSIS — D57.1 HB-SS DISEASE WITHOUT CRISIS: ICD-10-CM

## 2019-08-27 PROCEDURE — 99999 PR PBB SHADOW E&M-EST. PATIENT-LVL IV: CPT | Mod: PBBFAC,,, | Performed by: FAMILY MEDICINE

## 2019-08-27 PROCEDURE — 99213 PR OFFICE/OUTPT VISIT, EST, LEVL III, 20-29 MIN: ICD-10-PCS | Mod: S$PBB,,, | Performed by: FAMILY MEDICINE

## 2019-08-27 PROCEDURE — 99214 OFFICE O/P EST MOD 30 MIN: CPT | Mod: PBBFAC,PO | Performed by: FAMILY MEDICINE

## 2019-08-27 PROCEDURE — 2024F 7 FLD RTA PHOTO EVC RTNOPTHY: CPT | Mod: ,,, | Performed by: FAMILY MEDICINE

## 2019-08-27 PROCEDURE — 99213 OFFICE O/P EST LOW 20 MIN: CPT | Mod: S$PBB,,, | Performed by: FAMILY MEDICINE

## 2019-08-27 PROCEDURE — 99999 PR PBB SHADOW E&M-EST. PATIENT-LVL IV: ICD-10-PCS | Mod: PBBFAC,,, | Performed by: FAMILY MEDICINE

## 2019-08-27 PROCEDURE — 2024F PR 7 FIELD PHOTOS WITH INTERP/ REVIEW: ICD-10-PCS | Mod: ,,, | Performed by: FAMILY MEDICINE

## 2019-08-27 RX ORDER — TAMSULOSIN HYDROCHLORIDE 0.4 MG/1
CAPSULE ORAL
Refills: 3 | COMMUNITY
Start: 2019-08-12 | End: 2023-07-19 | Stop reason: SDUPTHER

## 2019-08-27 NOTE — PROGRESS NOTES
Subjective:   Patient ID: Royer Motley is a 63 y.o. male     Chief Complaint:Follow-up (3 months)      Patient with type 2 diabetes which currently well controlled.  Patient with sickle cell disease which currently stable.  This patient doing well    Review of Systems   Constitutional: Negative for activity change and unexpected weight change.   HENT: Positive for hearing loss. Negative for rhinorrhea and trouble swallowing.    Eyes: Negative for discharge and visual disturbance.   Respiratory: Negative for chest tightness and wheezing.    Cardiovascular: Negative for chest pain and palpitations.   Gastrointestinal: Negative for blood in stool, constipation, diarrhea and vomiting.   Endocrine: Negative for polydipsia and polyuria.   Genitourinary: Negative for difficulty urinating, hematuria and urgency.   Musculoskeletal: Negative for arthralgias, joint swelling and neck pain.   Neurological: Negative for weakness and headaches.   Psychiatric/Behavioral: Negative for confusion and dysphoric mood.     Past Medical History:   Diagnosis Date    Avascular necrosis of hip     Diabetes mellitus, type 2     Enlarged prostate     Sickle cell hemoglobin C disease      Objective:     Vitals:    08/27/19 1100   BP: 130/80   Pulse: (!) 56   Temp: 98 °F (36.7 °C)     Body mass index is 30.34 kg/m².  Physical Exam   Neck: Neck supple. No tracheal deviation present.   Cardiovascular: Normal rate, regular rhythm and normal heart sounds.   Pulses:       Dorsalis pedis pulses are 2+ on the right side, and 2+ on the left side.        Posterior tibial pulses are 2+ on the right side, and 2+ on the left side.   Pulmonary/Chest: Effort normal. No respiratory distress.   Musculoskeletal: Normal range of motion. He exhibits no edema.        Right foot: There is normal range of motion and no deformity.        Left foot: There is normal range of motion and no deformity.   Feet:   Right Foot:   Protective Sensation: 4 sites  tested. 4 sites sensed.   Skin Integrity: Negative for ulcer or blister.   Left Foot:   Protective Sensation: 4 sites tested. 4 sites sensed.   Skin Integrity: Negative for ulcer or blister.     Assessment:     1. Type 2 diabetes mellitus with stage 3 chronic kidney disease, without long-term current use of insulin    2. Hb-SS disease without crisis      Plan:   Type 2 diabetes mellitus with stage 3 chronic kidney disease, without long-term current use of insulin  -     Hemoglobin A1c; Future; Expected date: 02/27/2020  -     Comprehensive metabolic panel; Future; Expected date: 02/27/2020  Review of blood work from June 2019 shows an A1c of 6.8 which is very well controlled.  Will continue monitor this every 6 months.    Hb-SS disease without crisis  Patient with sickle cell disease currently followed by Oncology.  Reviewed blood work from June 2019 shows an H&H of 12/35 which is stable.  Continue to monitor regularly as patient with sickle cell disease.      Time spent with patient: 15 minutes and over half of that time was spent on counseling an coordination of care.    Nikko Agarwal MD  08/27/2019    Portions of this note have been dictated with MARÍA ELENA Van.

## 2020-02-15 ENCOUNTER — LAB VISIT (OUTPATIENT)
Dept: LAB | Facility: HOSPITAL | Age: 65
End: 2020-02-15
Attending: FAMILY MEDICINE
Payer: MEDICAID

## 2020-02-15 DIAGNOSIS — N18.30 TYPE 2 DIABETES MELLITUS WITH STAGE 3 CHRONIC KIDNEY DISEASE, WITHOUT LONG-TERM CURRENT USE OF INSULIN: ICD-10-CM

## 2020-02-15 DIAGNOSIS — E11.22 TYPE 2 DIABETES MELLITUS WITH STAGE 3 CHRONIC KIDNEY DISEASE, WITHOUT LONG-TERM CURRENT USE OF INSULIN: ICD-10-CM

## 2020-02-15 LAB
ALBUMIN SERPL BCP-MCNC: 3.6 G/DL (ref 3.5–5.2)
ALP SERPL-CCNC: 109 U/L (ref 55–135)
ALT SERPL W/O P-5'-P-CCNC: 22 U/L (ref 10–44)
ANION GAP SERPL CALC-SCNC: 6 MMOL/L (ref 8–16)
AST SERPL-CCNC: 21 U/L (ref 10–40)
BILIRUB SERPL-MCNC: 1.7 MG/DL (ref 0.1–1)
BUN SERPL-MCNC: 8 MG/DL (ref 8–23)
CALCIUM SERPL-MCNC: 8.6 MG/DL (ref 8.7–10.5)
CHLORIDE SERPL-SCNC: 106 MMOL/L (ref 95–110)
CO2 SERPL-SCNC: 25 MMOL/L (ref 23–29)
CREAT SERPL-MCNC: 1.4 MG/DL (ref 0.5–1.4)
EST. GFR  (AFRICAN AMERICAN): >60 ML/MIN/1.73 M^2
EST. GFR  (NON AFRICAN AMERICAN): 53.1 ML/MIN/1.73 M^2
ESTIMATED AVG GLUCOSE: 128 MG/DL (ref 68–131)
GLUCOSE SERPL-MCNC: 246 MG/DL (ref 70–110)
HBA1C MFR BLD HPLC: 6.1 % (ref 4–5.6)
POTASSIUM SERPL-SCNC: 4.7 MMOL/L (ref 3.5–5.1)
PROT SERPL-MCNC: 7.6 G/DL (ref 6–8.4)
SODIUM SERPL-SCNC: 137 MMOL/L (ref 136–145)

## 2020-02-15 PROCEDURE — 80053 COMPREHEN METABOLIC PANEL: CPT

## 2020-02-15 PROCEDURE — 83036 HEMOGLOBIN GLYCOSYLATED A1C: CPT

## 2020-02-15 PROCEDURE — 36415 COLL VENOUS BLD VENIPUNCTURE: CPT | Mod: PO

## 2020-02-18 ENCOUNTER — DOCUMENTATION ONLY (OUTPATIENT)
Dept: FAMILY MEDICINE | Facility: CLINIC | Age: 65
End: 2020-02-18

## 2020-02-18 NOTE — PROGRESS NOTES
Pre-Visit Chart Review  For Appointment Scheduled on 2/19/2020    Health Maintenance Due   Topic Date Due    TETANUS VACCINE  09/27/1974    Low Dose Statin  09/27/1977    Pneumococcal Vaccine (Highest Risk) (2 of 3 - PCV13) 10/13/2017

## 2020-02-19 ENCOUNTER — OFFICE VISIT (OUTPATIENT)
Dept: FAMILY MEDICINE | Facility: CLINIC | Age: 65
End: 2020-02-19
Payer: MEDICAID

## 2020-02-19 VITALS
DIASTOLIC BLOOD PRESSURE: 86 MMHG | SYSTOLIC BLOOD PRESSURE: 136 MMHG | OXYGEN SATURATION: 97 % | HEIGHT: 70 IN | BODY MASS INDEX: 29.98 KG/M2 | TEMPERATURE: 97 F | HEART RATE: 70 BPM | WEIGHT: 209.44 LBS

## 2020-02-19 DIAGNOSIS — E11.22 TYPE 2 DIABETES MELLITUS WITH STAGE 3 CHRONIC KIDNEY DISEASE, WITHOUT LONG-TERM CURRENT USE OF INSULIN: Primary | ICD-10-CM

## 2020-02-19 DIAGNOSIS — D57.1 HB-SS DISEASE WITHOUT CRISIS: ICD-10-CM

## 2020-02-19 DIAGNOSIS — N18.30 TYPE 2 DIABETES MELLITUS WITH STAGE 3 CHRONIC KIDNEY DISEASE, WITHOUT LONG-TERM CURRENT USE OF INSULIN: Primary | ICD-10-CM

## 2020-02-19 PROCEDURE — 99214 PR OFFICE/OUTPT VISIT, EST, LEVL IV, 30-39 MIN: ICD-10-PCS | Mod: S$PBB,,, | Performed by: FAMILY MEDICINE

## 2020-02-19 PROCEDURE — 2024F PR 7 FIELD PHOTOS WITH INTERP/ REVIEW: ICD-10-PCS | Mod: ,,, | Performed by: FAMILY MEDICINE

## 2020-02-19 PROCEDURE — 99999 PR PBB SHADOW E&M-EST. PATIENT-LVL IV: ICD-10-PCS | Mod: PBBFAC,,, | Performed by: FAMILY MEDICINE

## 2020-02-19 PROCEDURE — 99999 PR PBB SHADOW E&M-EST. PATIENT-LVL IV: CPT | Mod: PBBFAC,,, | Performed by: FAMILY MEDICINE

## 2020-02-19 PROCEDURE — 99214 OFFICE O/P EST MOD 30 MIN: CPT | Mod: PBBFAC,PO | Performed by: FAMILY MEDICINE

## 2020-02-19 PROCEDURE — 2024F 7 FLD RTA PHOTO EVC RTNOPTHY: CPT | Mod: ,,, | Performed by: FAMILY MEDICINE

## 2020-02-19 PROCEDURE — 99214 OFFICE O/P EST MOD 30 MIN: CPT | Mod: S$PBB,,, | Performed by: FAMILY MEDICINE

## 2020-02-19 RX ORDER — SULFAMETHOXAZOLE AND TRIMETHOPRIM 800; 160 MG/1; MG/1
TABLET ORAL
COMMUNITY
Start: 2019-12-23 | End: 2021-08-25

## 2020-02-19 NOTE — PROGRESS NOTES
Subjective:   Patient ID: Royer Motley is a 64 y.o. male     Chief Complaint:Follow-up (6 months)      Patient for checkup.  Patient doing well.  Patient with no complaints.    Review of Systems   Respiratory: Negative for shortness of breath.    Cardiovascular: Negative for chest pain.   Gastrointestinal: Negative for abdominal pain.   Genitourinary: Negative for dysuria.     Past Medical History:   Diagnosis Date    Avascular necrosis of hip     Diabetes mellitus, type 2     Enlarged prostate     Sickle cell hemoglobin C disease      Objective:     Vitals:    02/19/20 1047   BP: 136/86   Pulse: 70   Temp: 97.2 °F (36.2 °C)     Body mass index is 30.05 kg/m².  Physical Exam   Neck: Neck supple. No tracheal deviation present.   Cardiovascular: Normal rate, regular rhythm and normal heart sounds.   Pulmonary/Chest: Effort normal. No respiratory distress.   Musculoskeletal: Normal range of motion. He exhibits no edema.     Assessment:     1. Type 2 diabetes mellitus with stage 3 chronic kidney disease, without long-term current use of insulin    2. Hb-SS disease without crisis      Plan:   Type 2 diabetes mellitus with stage 3 chronic kidney disease, without long-term current use of insulin  -     Ambulatory referral/consult to Optometry; Future; Expected date: 02/26/2020  -     Hemoglobin A1c; Future; Expected date: 08/19/2020  -     Comprehensive metabolic panel; Future; Expected date: 08/19/2020  -     Lipid panel; Future; Expected date: 08/19/2020    Hb-SS disease without crisis  -     CBC auto differential; Future; Expected date: 08/19/2020      Time spent with patient: 30 minutes and over half of that time was spent on counseling an coordination of care.    Nikko Agarwal MD  02/19/2020    Portions of this note have been dictated with MARÍA ELENA Patterson

## 2020-03-04 ENCOUNTER — PATIENT OUTREACH (OUTPATIENT)
Dept: ADMINISTRATIVE | Facility: OTHER | Age: 65
End: 2020-03-04

## 2020-05-05 ENCOUNTER — PATIENT MESSAGE (OUTPATIENT)
Dept: ADMINISTRATIVE | Facility: HOSPITAL | Age: 65
End: 2020-05-05

## 2020-06-12 DIAGNOSIS — Z12.11 COLON CANCER SCREENING: ICD-10-CM

## 2020-08-07 ENCOUNTER — PATIENT OUTREACH (OUTPATIENT)
Dept: ADMINISTRATIVE | Facility: HOSPITAL | Age: 65
End: 2020-08-07

## 2020-08-07 DIAGNOSIS — E11.9 DIABETES MELLITUS WITHOUT COMPLICATION: Primary | ICD-10-CM

## 2020-08-07 DIAGNOSIS — E11.9 TYPE 2 DIABETES MELLITUS WITHOUT COMPLICATION, UNSPECIFIED WHETHER LONG TERM INSULIN USE: ICD-10-CM

## 2020-08-07 NOTE — PROGRESS NOTES
Chart review completed 2020.  Care Everywhere updates requested and reviewed.  Immunizations reconciled. Media reports reviewed.  Duplicate HM overrides and  orders removed.  Overdue HM topic chart audit and/or requested.  Overdue lab testing linked to upcoming lab appointments if applies.    WOG orders placed. URINE  Letter mailed.  YES    ALL LABS SCHEDULED    Health Maintenance Due   Topic Date Due    HIV Screening  1971    TETANUS VACCINE  1974    Low Dose Statin  1977    Pneumococcal Vaccine (Highest Risk) (2 of 3 - PCV13) 10/13/2017    Colorectal Cancer Screening  2019    Shingles Vaccine (2 of 2) 2020    Lipid Panel  2020    Eye Exam  2020    Urine Microalbumin  2020    Foot Exam  2020

## 2020-08-07 NOTE — LETTER
"August 7, 2020    Royer Motley  [address] [place] [address]             [facility]  [address]  [facility] [address]  Phone: [phone] [facility] committed to your overall health.  To help you get the most out of each of your visits, we will review your information to make sure you are up to date on all of your recommended tests and/or procedures.      Dr. Nikko Agarwal MD has found that your chart shows you may be due for a:    COLORECTAL CANCER SCREENING  DIABETIC EYE EXAM  DIABETIC FOOT EXAM     If you have had any of the above done at another facility, please bring the records or information with you so that your record at [facility] will be complete.  If you would like to schedule any of these, please contact the clinic at [phone].    Our records show you are due for colon cancer screening.  If you have already had your screening, or you have made an appointment for your screening, congratulations!  You're on the road to good health. If you haven't signed up for a colorectal screening please accept this invitation to be screened.      According to the American Cancer Society, colon cancer is the third most common cancer for people in the [place].  A simple screening test "Fit Kit" - done in your own home - can help find colon cancer at an early stage when it can be treated, even before any signs or symptoms develop. THIS IS A YEARLY TEST.    Testing for blood in your stool (feces or bowel movement) is the first step. If you have an upcoming visit with your Primary Care Physician you can  a Fit Kit during your visit or you can  a Fit Kit at your Primary Care Clinic prior to your visit.    The Fit Kit includes:    · Instructions on how to perform the test  · (1) Sheet of tissue paper  · (1) Small Absorption pad  · (1) Bottle to hold the sample and a small probe to help you take the sample  · (1) Small plastic bio-hazard bag  · (1) Postage-paid return " "envelope    Please do your test (the instructions will tell you how) and then return your sample in the postage-paid return envelope within 24 hours of collection.     If your test results are negative, you won't need testing again for another year.  If results show you need more testing, we will call you with next steps.    Regular colorectal cancer screening is one of the most powerful weapons for preventing colon cancer.  The website https://www.cancer.org/cancer/colon-rectal-cancer.html can answer many of your questions about this cancer and its prevention.  Just search for "colorectal cancer".        If you are currently taking medication, please bring it with you to your appointment for review.    Also, if you have any type of Advanced Directives, please bring them with you to your office visit so we may scan them into your chart.    Thank You,    Your [facility],  MD Shaila Ortiz LPN Clinical Care Coordinator  [facility] [address] [place] [address]  Phone [phone]  Fax [phone]       "

## 2020-08-20 ENCOUNTER — DOCUMENTATION ONLY (OUTPATIENT)
Dept: FAMILY MEDICINE | Facility: CLINIC | Age: 65
End: 2020-08-20

## 2020-08-20 NOTE — PROGRESS NOTES
Pre-Visit Chart Review  For Appointment Scheduled on 8/21/2020    Health Maintenance Due   Topic Date Due    HIV Screening  09/27/1971    TETANUS VACCINE  09/27/1974    Low Dose Statin  09/27/1977    Pneumococcal Vaccine (Highest Risk) (2 of 3 - PCV13) 10/13/2017    Colorectal Cancer Screening  06/04/2019    Shingles Vaccine (2 of 2) 02/14/2020    Lipid Panel  06/03/2020    Eye Exam  06/03/2020    Urine Microalbumin  06/03/2020    Hemoglobin A1c  08/15/2020    Foot Exam  08/27/2020

## 2020-08-21 ENCOUNTER — OFFICE VISIT (OUTPATIENT)
Dept: FAMILY MEDICINE | Facility: CLINIC | Age: 65
End: 2020-08-21
Payer: MEDICAID

## 2020-08-21 ENCOUNTER — LAB VISIT (OUTPATIENT)
Dept: LAB | Facility: HOSPITAL | Age: 65
End: 2020-08-21
Attending: FAMILY MEDICINE
Payer: MEDICAID

## 2020-08-21 VITALS
WEIGHT: 203.06 LBS | SYSTOLIC BLOOD PRESSURE: 110 MMHG | RESPIRATION RATE: 16 BRPM | HEART RATE: 69 BPM | TEMPERATURE: 98 F | BODY MASS INDEX: 29.07 KG/M2 | DIASTOLIC BLOOD PRESSURE: 70 MMHG | HEIGHT: 70 IN | OXYGEN SATURATION: 97 %

## 2020-08-21 DIAGNOSIS — E11.22 TYPE 2 DIABETES MELLITUS WITH STAGE 3 CHRONIC KIDNEY DISEASE, WITHOUT LONG-TERM CURRENT USE OF INSULIN: Primary | ICD-10-CM

## 2020-08-21 DIAGNOSIS — D57.1 HB-SS DISEASE WITHOUT CRISIS: ICD-10-CM

## 2020-08-21 DIAGNOSIS — N18.30 TYPE 2 DIABETES MELLITUS WITH STAGE 3 CHRONIC KIDNEY DISEASE, WITHOUT LONG-TERM CURRENT USE OF INSULIN: Primary | ICD-10-CM

## 2020-08-21 DIAGNOSIS — N18.30 TYPE 2 DIABETES MELLITUS WITH STAGE 3 CHRONIC KIDNEY DISEASE, WITHOUT LONG-TERM CURRENT USE OF INSULIN: ICD-10-CM

## 2020-08-21 DIAGNOSIS — E11.22 TYPE 2 DIABETES MELLITUS WITH STAGE 3 CHRONIC KIDNEY DISEASE, WITHOUT LONG-TERM CURRENT USE OF INSULIN: ICD-10-CM

## 2020-08-21 LAB
ALBUMIN SERPL BCP-MCNC: 3.7 G/DL (ref 3.5–5.2)
ALP SERPL-CCNC: 86 U/L (ref 55–135)
ALT SERPL W/O P-5'-P-CCNC: 23 U/L (ref 10–44)
ANION GAP SERPL CALC-SCNC: 9 MMOL/L (ref 8–16)
AST SERPL-CCNC: 27 U/L (ref 10–40)
BASOPHILS # BLD AUTO: 0.06 K/UL (ref 0–0.2)
BASOPHILS NFR BLD: 0.6 % (ref 0–1.9)
BILIRUB SERPL-MCNC: 2.1 MG/DL (ref 0.1–1)
BUN SERPL-MCNC: 11 MG/DL (ref 8–23)
CALCIUM SERPL-MCNC: 8.9 MG/DL (ref 8.7–10.5)
CHLORIDE SERPL-SCNC: 107 MMOL/L (ref 95–110)
CHOLEST SERPL-MCNC: 128 MG/DL (ref 120–199)
CHOLEST/HDLC SERPL: 4.1 {RATIO} (ref 2–5)
CO2 SERPL-SCNC: 22 MMOL/L (ref 23–29)
CREAT SERPL-MCNC: 1.3 MG/DL (ref 0.5–1.4)
DIFFERENTIAL METHOD: ABNORMAL
EOSINOPHIL # BLD AUTO: 0.4 K/UL (ref 0–0.5)
EOSINOPHIL NFR BLD: 4.1 % (ref 0–8)
ERYTHROCYTE [DISTWIDTH] IN BLOOD BY AUTOMATED COUNT: 14.6 % (ref 11.5–14.5)
EST. GFR  (AFRICAN AMERICAN): >60 ML/MIN/1.73 M^2
EST. GFR  (NON AFRICAN AMERICAN): 58 ML/MIN/1.73 M^2
ESTIMATED AVG GLUCOSE: 200 MG/DL (ref 68–131)
GLUCOSE SERPL-MCNC: 215 MG/DL (ref 70–110)
HBA1C MFR BLD HPLC: 8.6 % (ref 4–5.6)
HCT VFR BLD AUTO: 37.4 % (ref 40–54)
HDLC SERPL-MCNC: 31 MG/DL (ref 40–75)
HDLC SERPL: 24.2 % (ref 20–50)
HGB BLD-MCNC: 13 G/DL (ref 14–18)
IMM GRANULOCYTES # BLD AUTO: 0.01 K/UL (ref 0–0.04)
IMM GRANULOCYTES NFR BLD AUTO: 0.1 % (ref 0–0.5)
LDLC SERPL CALC-MCNC: 71.6 MG/DL (ref 63–159)
LYMPHOCYTES # BLD AUTO: 3.2 K/UL (ref 1–4.8)
LYMPHOCYTES NFR BLD: 32.6 % (ref 18–48)
MCH RBC QN AUTO: 27.5 PG (ref 27–31)
MCHC RBC AUTO-ENTMCNC: 34.8 G/DL (ref 32–36)
MCV RBC AUTO: 79 FL (ref 82–98)
MONOCYTES # BLD AUTO: 0.7 K/UL (ref 0.3–1)
MONOCYTES NFR BLD: 7.2 % (ref 4–15)
NEUTROPHILS # BLD AUTO: 5.4 K/UL (ref 1.8–7.7)
NEUTROPHILS NFR BLD: 55.4 % (ref 38–73)
NONHDLC SERPL-MCNC: 97 MG/DL
NRBC BLD-RTO: 3 /100 WBC
PLATELET # BLD AUTO: 237 K/UL (ref 150–350)
PMV BLD AUTO: 10.7 FL (ref 9.2–12.9)
POTASSIUM SERPL-SCNC: 4.7 MMOL/L (ref 3.5–5.1)
PROT SERPL-MCNC: 7.6 G/DL (ref 6–8.4)
RBC # BLD AUTO: 4.72 M/UL (ref 4.6–6.2)
SODIUM SERPL-SCNC: 138 MMOL/L (ref 136–145)
TRIGL SERPL-MCNC: 127 MG/DL (ref 30–150)
WBC # BLD AUTO: 9.72 K/UL (ref 3.9–12.7)

## 2020-08-21 PROCEDURE — 80061 LIPID PANEL: CPT

## 2020-08-21 PROCEDURE — 99999 PR PBB SHADOW E&M-EST. PATIENT-LVL IV: ICD-10-PCS | Mod: PBBFAC,,, | Performed by: FAMILY MEDICINE

## 2020-08-21 PROCEDURE — 99213 PR OFFICE/OUTPT VISIT, EST, LEVL III, 20-29 MIN: ICD-10-PCS | Mod: S$PBB,,, | Performed by: FAMILY MEDICINE

## 2020-08-21 PROCEDURE — 99214 OFFICE O/P EST MOD 30 MIN: CPT | Mod: PBBFAC,PO | Performed by: FAMILY MEDICINE

## 2020-08-21 PROCEDURE — 83036 HEMOGLOBIN GLYCOSYLATED A1C: CPT

## 2020-08-21 PROCEDURE — 85025 COMPLETE CBC W/AUTO DIFF WBC: CPT

## 2020-08-21 PROCEDURE — 80053 COMPREHEN METABOLIC PANEL: CPT

## 2020-08-21 PROCEDURE — 99213 OFFICE O/P EST LOW 20 MIN: CPT | Mod: S$PBB,,, | Performed by: FAMILY MEDICINE

## 2020-08-21 PROCEDURE — 99999 PR PBB SHADOW E&M-EST. PATIENT-LVL IV: CPT | Mod: PBBFAC,,, | Performed by: FAMILY MEDICINE

## 2020-08-21 PROCEDURE — 36415 COLL VENOUS BLD VENIPUNCTURE: CPT

## 2020-08-21 NOTE — PROGRESS NOTES
Subjective:   Patient ID: Royer Motley is a 64 y.o. male     Chief Complaint:Follow-up (6 months)      For checkup.  Patient doing well.    Review of Systems   Respiratory: Negative for shortness of breath.    Cardiovascular: Negative for chest pain.   Gastrointestinal: Negative for abdominal pain.   Genitourinary: Negative for dysuria.     Past Medical History:   Diagnosis Date    Avascular necrosis of hip     Diabetes mellitus, type 2     Enlarged prostate     Sickle cell hemoglobin C disease      Past Surgical History:   Procedure Laterality Date    ANKLE DEBRIDEMENT Right     CHOLECYSTECTOMY      MANDIBLE FRACTURE SURGERY       Objective:     Vitals:    08/21/20 1303   BP: 110/70   Pulse: 69   Resp: 16   Temp: 98.2 °F (36.8 °C)     Body mass index is 29.13 kg/m².  Physical Exam  Cardiovascular:      Pulses:           Dorsalis pedis pulses are 2+ on the right side and 2+ on the left side.        Posterior tibial pulses are 2+ on the right side and 2+ on the left side.   Musculoskeletal:      Right foot: Normal range of motion.      Left foot: Normal range of motion.   Feet:      Right foot:      Protective Sensation: 4 sites tested. 4 sites sensed.      Skin integrity: Skin integrity normal.      Left foot:      Protective Sensation: 4 sites tested. 4 sites sensed.      Skin integrity: Skin integrity normal.       Assessment:     1. Type 2 diabetes mellitus with stage 3 chronic kidney disease, without long-term current use of insulin      Plan:   Type 2 diabetes mellitus with stage 3 chronic kidney disease, without long-term current use of insulin  -     Microalbumin/creatinine urine ratio; Future; Expected date: 08/21/2020  -     Comprehensive metabolic panel; Future; Expected date: 08/21/2020  -     Hemoglobin A1C; Future; Expected date: 08/21/2020            Time spent with patient: 15 minutes and over half of that time was spent on counseling an coordination of care.    Nikko Agarwal,  MD  08/21/2020    Portions of this note have been dictated with MARÍA ELENA Van.

## 2020-08-24 RX ORDER — GLIMEPIRIDE 1 MG/1
2 TABLET ORAL
Qty: 180 TABLET | Refills: 3 | Status: SHIPPED | OUTPATIENT
Start: 2020-08-24 | End: 2020-09-16

## 2020-08-26 ENCOUNTER — PATIENT OUTREACH (OUTPATIENT)
Dept: ADMINISTRATIVE | Facility: OTHER | Age: 65
End: 2020-08-26

## 2020-08-26 NOTE — PROGRESS NOTES
Health Maintenance Due   Topic Date Due    TETANUS VACCINE  09/27/1974    Pneumococcal Vaccine (Highest Risk) (2 of 3 - PCV13) 10/13/2017    Colorectal Cancer Screening  06/04/2019    Shingles Vaccine (2 of 2) 02/14/2020    Eye Exam  06/03/2020     Updates were requested from care everywhere.  Chart was reviewed for overdue Proactive [facility] Encounters ([facility]) topics (CRS, Breast Cancer Screening, Eye exam)  Health Maintenance has been updated.  LINKS immunization registry triggered.  Immunizations were reconciled.

## 2020-08-31 ENCOUNTER — CLINICAL SUPPORT (OUTPATIENT)
Dept: DIABETES | Facility: CLINIC | Age: 65
End: 2020-08-31
Payer: MEDICAID

## 2020-08-31 ENCOUNTER — TELEPHONE (OUTPATIENT)
Dept: DIABETES | Facility: CLINIC | Age: 65
End: 2020-08-31

## 2020-08-31 VITALS — HEIGHT: 70 IN | WEIGHT: 202.81 LBS | BODY MASS INDEX: 29.03 KG/M2

## 2020-08-31 PROCEDURE — 99212 OFFICE O/P EST SF 10 MIN: CPT | Mod: PBBFAC,PO | Performed by: DIETITIAN, REGISTERED

## 2020-08-31 PROCEDURE — G0108 DIAB MANAGE TRN  PER INDIV: HCPCS | Mod: PBBFAC,PO | Performed by: DIETITIAN, REGISTERED

## 2020-08-31 PROCEDURE — 99999 PR PBB SHADOW E&M-EST. PATIENT-LVL II: ICD-10-PCS | Mod: PBBFAC,,, | Performed by: DIETITIAN, REGISTERED

## 2020-08-31 PROCEDURE — 99999 PR PBB SHADOW E&M-EST. PATIENT-LVL II: CPT | Mod: PBBFAC,,, | Performed by: DIETITIAN, REGISTERED

## 2020-08-31 NOTE — TELEPHONE ENCOUNTER
Patient last saw Latha 11/2017.  I was trying to schedule him to come back in to reestablish care with her but first available is in mid October and only morning appointments are populating.  He was hoping to see her in the [place] clinic in afternoon time frame.  Would you be able to find anywhere else on her schedule other than 8:30 am appointments?  If so, can you schedule it for me?  His preferred contact number is [phone].    Thanks

## 2020-09-01 NOTE — PROGRESS NOTES
Diabetes Education  Author: Mnoika Conway RD, CDE  Date: 9/1/2020    Diabetes Care Management Summary  Diabetes Education Record Assessment/Progress: Initial-Patient was last seen for dm education in 2016.  He has not seen Endocrinology since 11/2017.  He reports that he had stopped taking his medications and was very thirsty.  He is back on meds now and symptoms have improved.  Current Diabetes Risk Level: Moderate     Last A1c:   Lab Results   Component Value Date    HGBA1C 8.6 (H) 08/21/2020     Last visit with Diabetes Educator: : 08/31/2020    Diabetes Type  Diabetes Type : Type II    Diabetes History  Diabetes Diagnosis: >10 years  Current Treatment: Oral Medication-Meformin, Glimepiride - just increased dose to 2 mg  Reviewed Problem List with Patient: Yes    Health Maintenance was reviewed today with patient. Discussed with patient importance of routine eye exams, foot exams/foot care, blood work (i.e.: A1c, microalbumin, and lipid), dental visits, yearly flu vaccine, and pneumonia vaccine as indicated by PCP. Patient verbalized understanding.     Health Maintenance Topics with due status: Not Due       Topic Last Completion Date    Lipid Panel 08/21/2020    Hemoglobin A1c 08/21/2020    Urine Microalbumin 08/24/2020     Health Maintenance Due   Topic Date Due    HIV Screening  09/27/1971    TETANUS VACCINE  09/27/1974    Low Dose Statin  09/27/1977    Pneumococcal Vaccine (Highest Risk) (2 of 3 - PCV13) 10/13/2017    Colorectal Cancer Screening  06/04/2019    Shingles Vaccine (2 of 2) 02/14/2020    Eye Exam  06/03/2020    Foot Exam  08/27/2020    Influenza Vaccine (1) 09/01/2020     Nutrition  Meal Planning: Patient admits to drinking a lot of juices and fruit punch over the past few months.  He likes starchy foods and eating moderate to large amounts per meal.      Monitoring   Monitoring: Has up to date meter  Self Monitoring : Had gotten away from testing but recently started testing again.   States numbers are in high 100's vs 300-400 range  Blood Glucose Logs: No  Do you use a personal continuous glucose monitor: No  In the last month, how often have you had a low blood sugar reaction: never  Can you tell when your blood sugar is too high: yes-increased thirst and urination    Social History  Preferred Learning Method: Face to Face  Primary Support: Self, Spouse  Smoking Status: Never a Smoker  Alcohol Use: Never    Barriers to Change  Barriers to Change: None  Learning Challenges : None    Readiness to Learn   Readiness to Learn : Acceptance    Cultural Influences  Cultural Influences: No    Diabetes Education Assessment/Progress  Diabetes Disease Process (diabetes disease process and treatment options): Discussion, Demonstrates Understanding/Competency(verbalizes/demonstrates)-Reviewed A1c value and goal blood sugars.  Patient feels that increase in A1c value is due to stopping medications    Nutrition (Incorporating nutritional management into one's lifestyle): Instructed, Demonstrates Understanding/Competency (verbalizes/demonstrates)-Reviewed carb sources and appropriate amounts per meal and snack. Discussed label reading and alternatives for high sugar drinks.  Encouraged him to limit intake of juices and limit portions of starchy foods    Medications (states correct name, dose, onset, peak, duration, side effects & timing of meds): Discussion-Patient admits he had stopped taking medications but now compliant with both Metformin and Glimepiride    Monitoring (monitoring blood glucose/other parameters & using results): Encouraged him to test twice daily on regular basis.   He will keep logs to bring to clinic visits for review    Acute Complications (preventing, detecting, and treating acute complications): Denies any symptoms of hypoglycemia.  Reviewed symptoms, prevention and treatment    Goals  Patient has selected/evaluated goals during today's session: Yes, selected  Healthy Eating: In  Progress(Will eliminate fruit punch and limit intake of fruit juices)  Monitoring: Set(Will begin monitoring twice daily and keep logs for provider to review)    EDUCATION/PLAN:  Patient was scheduled with Latha Garcia in 2 weeks to reestablish care.  He agrees to test sugars twice daily and bring logs with him to that appointment for review. Written materials provided and encouraged pt to call with any questions/concerns.  Can f/u for education as needed.      Today's Self-Management Care Plan was developed with the patient's input and is based on barriers identified during today's assessment.    The long and short-term goals in the care plan were written with the patient/caregiver's input. The patient has agreed to work toward these goals to improve his overall diabetes control.      The patient received a copy of today's self-management plan and verbalized understanding of the care plan, goals, and all of today's instructions.      The patient was encouraged to communicate with his physician and care team regarding his condition(s) and treatment.  I provided the patient with my contact information today and encouraged him to contact me via phone or patient portal as needed.     Education Units of Time   Time Spent: 45 min

## 2020-09-16 ENCOUNTER — OFFICE VISIT (OUTPATIENT)
Dept: ENDOCRINOLOGY | Facility: CLINIC | Age: 65
End: 2020-09-16
Payer: MEDICAID

## 2020-09-16 ENCOUNTER — TELEPHONE (OUTPATIENT)
Dept: OPTOMETRY | Facility: CLINIC | Age: 65
End: 2020-09-16

## 2020-09-16 ENCOUNTER — TELEPHONE (OUTPATIENT)
Dept: ENDOCRINOLOGY | Facility: CLINIC | Age: 65
End: 2020-09-16

## 2020-09-16 VITALS
SYSTOLIC BLOOD PRESSURE: 134 MMHG | HEART RATE: 62 BPM | HEIGHT: 70 IN | BODY MASS INDEX: 28.71 KG/M2 | DIASTOLIC BLOOD PRESSURE: 90 MMHG | WEIGHT: 200.5 LBS

## 2020-09-16 PROCEDURE — 99999 PR PBB SHADOW E&M-EST. PATIENT-LVL IV: ICD-10-PCS | Mod: PBBFAC,,, | Performed by: NURSE PRACTITIONER

## 2020-09-16 PROCEDURE — 99214 PR OFFICE/OUTPT VISIT, EST, LEVL IV, 30-39 MIN: ICD-10-PCS | Mod: S$PBB,,, | Performed by: NURSE PRACTITIONER

## 2020-09-16 PROCEDURE — 99999 PR PBB SHADOW E&M-EST. PATIENT-LVL IV: CPT | Mod: PBBFAC,,, | Performed by: NURSE PRACTITIONER

## 2020-09-16 PROCEDURE — 99214 OFFICE O/P EST MOD 30 MIN: CPT | Mod: S$PBB,,, | Performed by: NURSE PRACTITIONER

## 2020-09-16 PROCEDURE — 99214 OFFICE O/P EST MOD 30 MIN: CPT | Mod: PBBFAC,PO | Performed by: NURSE PRACTITIONER

## 2020-09-16 RX ORDER — GLIMEPIRIDE 1 MG/1
1 TABLET ORAL
Qty: 180 TABLET | Refills: 3
Start: 2020-09-16 | End: 2021-02-25 | Stop reason: SDUPTHER

## 2020-09-16 RX ORDER — METFORMIN HYDROCHLORIDE 500 MG/1
500 TABLET, EXTENDED RELEASE ORAL DAILY
Qty: 360 TABLET | Refills: 1
Start: 2020-09-16 | End: 2020-11-05

## 2020-09-16 NOTE — TELEPHONE ENCOUNTER
Latha Brown put a referral for pt to see an ophthalmologist. I could not scheduled him because he has medicaid. Can you please call pt. And schedule    Thanks

## 2020-09-16 NOTE — PROGRESS NOTES
CC: Mr. Royer Motley arrives today for management of Type 2 DM and review of chronic medical conditions, as listed in the visit diagnosis section of this encounter.     HPI: Mr. Royer Motley was diagnosed with Type 2 DM in 2004 during a hospitalization for a sickle cell crisis and metformin was initiated. He started taking Lantus and Novolog insulin in 2012 after an admission for DKA and SCC.  This was later discontinued and glimepiride was added. + FH of DM in both parents, 2 sisters.     Patient was last seen by me in November 2017. Unfortunately, he is 20 min late for his appointment today. Only urgent needs will be addressed.     Patient admits that he was eating large quantities of sweets/processed foods prior to recent meeting with dietician. He was also not taking medications regularly. He has since made positive changes.     He states that he was taking 4 metformin XR tablets and 2 glimepiride tablets in the morning. Since blood sugars have been trending down, he states that he has decreased this dose to 1 tablet at noon. He also reduced his glimepiride to 1 mg daily.    BG readings are checked 2-3x/day. Brings log.          Hypoglycemia: Rare - reports one reading of 68  Symptoms: none  Treatment: soda    Missing Insulin/PO medication doses: Rare - however, taking different doses than what is prescribed.     Exercise: Yes, does weights. Plans to buy bike.     Dietary Habits: Eats 3 meals/day. Has decreased snacking. Avoiding sugary beverages. He is inquiring about Ensure.     Last DM education appointment: 8/31/2020      CURRENT DIABETIC MEDS: glimepiride 1 mg daily, Glucophage  mg daily (noon)  Glucometer type: unsure    Previous DM treatments:  Glimepiride  Lantus  Novolog    Last Eye Exam: 6/2019 - no  Past eye camera revealed poor quality images  Last Podiatry Exam: never    REVIEW OF SYSTEMS  Constitutional: no c/o fatigue, weakness, or weight loss.   Eyes: denies visual  "disturbances. However, has difficulty reading small print.   Cardiac: no palpitations or chest pain.   Respiratory: no cough or dyspnea.  GI: no c/o abdominal pain or nausea. Denies h/o pancreatitis.   Skin: no lesions or rashes.  Neuro: no numbness, tingling, or parasthesias.  Endocrine: denies polyphagia, polydipsia, polyuria.      Personally reviewed Past Medical, Surgical, Social History.    Vital Signs  BP (!) 134/90   Pulse 62   Ht 5' 10" (1.778 m)   Wt 90.9 kg (200 lb 8.1 oz)   BMI 28.77 kg/m²     Personally reviewed the below labs:    Hemoglobin A1C   Date Value Ref Range Status   08/21/2020 8.6 (H) 4.0 - 5.6 % Final     Comment:     ADA Screening Guidelines:  5.7-6.4%  Consistent with prediabetes  >or=6.5%  Consistent with diabetes  High levels of fetal hemoglobin interfere with the HbA1C  assay. Heterozygous hemoglobin variants (HbS, HgC, etc)do  not significantly interfere with this assay.   However, presence of multiple variants may affect accuracy.     02/15/2020 6.1 (H) 4.0 - 5.6 % Final     Comment:     ADA Screening Guidelines:  5.7-6.4%  Consistent with prediabetes  >or=6.5%  Consistent with diabetes  High levels of fetal hemoglobin interfere with the HbA1C  assay. Heterozygous hemoglobin variants (HbS, HgC, etc)do  not significantly interfere with this assay.   However, presence of multiple variants may affect accuracy.     06/03/2019 6.8 (H) 4.0 - 5.6 % Final     Comment:     ADA Screening Guidelines:  5.7-6.4%  Consistent with prediabetes  >or=6.5%  Consistent with diabetes  High levels of fetal hemoglobin interfere with the HbA1C  assay. Heterozygous hemoglobin variants (HbS, HgC, etc)do  not significantly interfere with this assay.   However, presence of multiple variants may affect accuracy.         Chemistry        Component Value Date/Time     08/21/2020 1220    K 4.7 08/21/2020 1220     08/21/2020 1220    CO2 22 (L) 08/21/2020 1220    BUN 11 08/21/2020 1220    CREATININE 1.3 " 08/21/2020 1220     (H) 08/21/2020 1220        Component Value Date/Time    CALCIUM 8.9 08/21/2020 1220    ALKPHOS 86 08/21/2020 1220    AST 27 08/21/2020 1220    ALT 23 08/21/2020 1220    BILITOT 2.1 (H) 08/21/2020 1220          Lab Results   Component Value Date    CHOL 128 08/21/2020    CHOL 126 06/03/2019    CHOL 115 (L) 09/09/2016     Lab Results   Component Value Date    HDL 31 (L) 08/21/2020    HDL 37 (L) 06/03/2019    HDL 30 (L) 09/09/2016     Lab Results   Component Value Date    LDLCALC 71.6 08/21/2020    LDLCALC 69.4 06/03/2019    LDLCALC 60.0 (L) 09/09/2016     Lab Results   Component Value Date    TRIG 127 08/21/2020    TRIG 98 06/03/2019    TRIG 125 09/09/2016     Lab Results   Component Value Date    CHOLHDL 24.2 08/21/2020    CHOLHDL 29.4 06/03/2019    CHOLHDL 26.1 09/09/2016       Lab Results   Component Value Date    MICALBCREAT 7.4 08/24/2020     Lab Results   Component Value Date    TSH 0.326 (L) 10/13/2011       CrCl cannot be calculated (Patient's most recent lab result is older than the maximum 7 days allowed.).    No results found for: [id number]      PHYSICAL EXAMINATION  Constitutional: Appears well, no distress  Neck: Supple, trachea midline; no thyromegaly or nodules.   Respiratory: CTA, even and unlabored.  Cardiovascular: RRR, no murmurs, no carotid bruits. DP pulses  2+ bilaterally; no edema.    GI: active bowel sounds, no hernia  Skin: warm and dry; no lipohypertrophy, + acanthosis nigracans observed.  Neuro: diminished DTR to BUE/BLE. No loss of protective sensation via 10 gm monofilament or vibratory exam bilaterally  Feet: appropriate footwear. No open wounds or calluses.    A1c target < 7%    Assessment/Plan  1. Uncontrolled type 2 diabetes mellitus with diabetic polyneuropathy, without long-term current use of insulin  -- Significantly improved. A1c previously well controlled earlier this year, prior to stopping medications and admitted poor dietary choices.    --  continue metformin and glimepiride at current doses.   -- check BG 1x/day, alternating times. Advised pt to notify me if BG < 70 or >150 often.   -- discussed meal supplement shakes that are lower carb than Ensure.   -- Ophthalmology referral    -- Discussed diagnosis of DM, A1c goals, progression of disease, long term complications and tx options.  Advised patient to check BG before activities, such as driving or exercise.  -- Reviewed hypoglycemia management: treat with 1/2 glass of juice, 1/2 can regular coke, or 4 glucose tablets. Monitor and repeat treatment every 15 minutes until BG is >70 Then have a snack, which includes a complex carbohydrate and protein.      -- not on statin. Aware LDL has been historically at goal or below.        FOLLOW UP  Follow up in about 6 months (around 3/16/2021). PCP already has lab work ordered in February.   Patient instructed to bring BG logs to each follow up   Patient encouraged to call for any BG/medication issues, concerns, or questions.    Orders Placed This Encounter   Procedures    Ambulatory referral/consult to Ophthalmology

## 2020-09-16 NOTE — PATIENT INSTRUCTIONS
Can use Glucerna or Boost Glucose Control shakes as part of your meal. These are meal supplements.

## 2020-09-16 NOTE — TELEPHONE ENCOUNTER
S/w pt and notified him of Ophthalmologist previous message. Pt will call Dr. Cisneros to make appt   [phone]

## 2020-10-05 ENCOUNTER — PATIENT MESSAGE (OUTPATIENT)
Dept: ADMINISTRATIVE | Facility: HOSPITAL | Age: 65
End: 2020-10-05

## 2020-10-30 ENCOUNTER — PATIENT MESSAGE (OUTPATIENT)
Dept: ADMINISTRATIVE | Facility: HOSPITAL | Age: 65
End: 2020-10-30

## 2021-01-04 ENCOUNTER — PATIENT MESSAGE (OUTPATIENT)
Dept: ADMINISTRATIVE | Facility: HOSPITAL | Age: 66
End: 2021-01-04

## 2021-02-25 ENCOUNTER — LAB VISIT (OUTPATIENT)
Dept: LAB | Facility: HOSPITAL | Age: 66
End: 2021-02-25
Attending: FAMILY MEDICINE
Payer: MEDICAID

## 2021-02-25 ENCOUNTER — OFFICE VISIT (OUTPATIENT)
Dept: FAMILY MEDICINE | Facility: CLINIC | Age: 66
End: 2021-02-25
Payer: MEDICAID

## 2021-02-25 VITALS
BODY MASS INDEX: 28.66 KG/M2 | HEART RATE: 68 BPM | RESPIRATION RATE: 18 BRPM | DIASTOLIC BLOOD PRESSURE: 62 MMHG | WEIGHT: 200.19 LBS | SYSTOLIC BLOOD PRESSURE: 128 MMHG | OXYGEN SATURATION: 97 % | TEMPERATURE: 97 F | HEIGHT: 70 IN

## 2021-02-25 PROCEDURE — 99999 PR PBB SHADOW E&M-EST. PATIENT-LVL IV: ICD-10-PCS | Mod: PBBFAC,,, | Performed by: FAMILY MEDICINE

## 2021-02-25 PROCEDURE — 80053 COMPREHEN METABOLIC PANEL: CPT

## 2021-02-25 PROCEDURE — 83036 HEMOGLOBIN GLYCOSYLATED A1C: CPT

## 2021-02-25 PROCEDURE — 99213 OFFICE O/P EST LOW 20 MIN: CPT | Mod: S$PBB,,, | Performed by: FAMILY MEDICINE

## 2021-02-25 PROCEDURE — 36415 COLL VENOUS BLD VENIPUNCTURE: CPT | Mod: PO | Performed by: FAMILY MEDICINE

## 2021-02-25 PROCEDURE — 99214 OFFICE O/P EST MOD 30 MIN: CPT | Mod: PBBFAC,PO | Performed by: FAMILY MEDICINE

## 2021-02-25 PROCEDURE — 99213 PR OFFICE/OUTPT VISIT, EST, LEVL III, 20-29 MIN: ICD-10-PCS | Mod: S$PBB,,, | Performed by: FAMILY MEDICINE

## 2021-02-25 PROCEDURE — 99999 PR PBB SHADOW E&M-EST. PATIENT-LVL IV: CPT | Mod: PBBFAC,,, | Performed by: FAMILY MEDICINE

## 2021-02-25 RX ORDER — GLIMEPIRIDE 1 MG/1
1 TABLET ORAL
Qty: 180 TABLET | Refills: 3 | Status: SHIPPED | OUTPATIENT
Start: 2021-02-25 | End: 2022-08-02

## 2021-02-25 RX ORDER — METFORMIN HYDROCHLORIDE 500 MG/1
500 TABLET, EXTENDED RELEASE ORAL 2 TIMES DAILY WITH MEALS
Qty: 180 TABLET | Refills: 3 | Status: SHIPPED | OUTPATIENT
Start: 2021-02-25 | End: 2022-08-02

## 2021-02-26 LAB
ALBUMIN SERPL BCP-MCNC: 3.8 G/DL (ref 3.5–5.2)
ALP SERPL-CCNC: 101 U/L (ref 55–135)
ALT SERPL W/O P-5'-P-CCNC: 22 U/L (ref 10–44)
ANION GAP SERPL CALC-SCNC: 12 MMOL/L (ref 8–16)
AST SERPL-CCNC: 26 U/L (ref 10–40)
BILIRUB SERPL-MCNC: 2.1 MG/DL (ref 0.1–1)
BUN SERPL-MCNC: 15 MG/DL (ref 8–23)
CALCIUM SERPL-MCNC: 8.3 MG/DL (ref 8.7–10.5)
CHLORIDE SERPL-SCNC: 110 MMOL/L (ref 95–110)
CO2 SERPL-SCNC: 18 MMOL/L (ref 23–29)
CREAT SERPL-MCNC: 1.4 MG/DL (ref 0.5–1.4)
EST. GFR  (AFRICAN AMERICAN): >60 ML/MIN/1.73 M^2
EST. GFR  (NON AFRICAN AMERICAN): 52.7 ML/MIN/1.73 M^2
ESTIMATED AVG GLUCOSE: 137 MG/DL (ref 68–131)
GLUCOSE SERPL-MCNC: 222 MG/DL (ref 70–110)
HBA1C MFR BLD: 6.4 % (ref 4–5.6)
POTASSIUM SERPL-SCNC: 4.9 MMOL/L (ref 3.5–5.1)
PROT SERPL-MCNC: 7.5 G/DL (ref 6–8.4)
SODIUM SERPL-SCNC: 140 MMOL/L (ref 136–145)

## 2021-04-05 ENCOUNTER — PATIENT MESSAGE (OUTPATIENT)
Dept: ADMINISTRATIVE | Facility: HOSPITAL | Age: 66
End: 2021-04-05

## 2021-06-16 DIAGNOSIS — Z12.11 COLON CANCER SCREENING: ICD-10-CM

## 2021-07-07 ENCOUNTER — PATIENT MESSAGE (OUTPATIENT)
Dept: ADMINISTRATIVE | Facility: HOSPITAL | Age: 66
End: 2021-07-07

## 2021-08-04 ENCOUNTER — PATIENT MESSAGE (OUTPATIENT)
Dept: ADMINISTRATIVE | Facility: HOSPITAL | Age: 66
End: 2021-08-04

## 2021-08-25 ENCOUNTER — OFFICE VISIT (OUTPATIENT)
Dept: FAMILY MEDICINE | Facility: CLINIC | Age: 66
End: 2021-08-25
Payer: MEDICAID

## 2021-08-25 VITALS
HEIGHT: 70 IN | HEART RATE: 80 BPM | OXYGEN SATURATION: 97 % | BODY MASS INDEX: 28.47 KG/M2 | SYSTOLIC BLOOD PRESSURE: 130 MMHG | WEIGHT: 198.88 LBS | TEMPERATURE: 98 F | DIASTOLIC BLOOD PRESSURE: 84 MMHG

## 2021-08-25 DIAGNOSIS — G62.9 NEUROPATHY: ICD-10-CM

## 2021-08-25 DIAGNOSIS — Z12.11 SCREENING FOR COLON CANCER: ICD-10-CM

## 2021-08-25 DIAGNOSIS — E11.9 TYPE 2 DIABETES MELLITUS WITHOUT COMPLICATION: ICD-10-CM

## 2021-08-25 PROCEDURE — 99214 PR OFFICE/OUTPT VISIT, EST, LEVL IV, 30-39 MIN: ICD-10-PCS | Mod: S$PBB,,, | Performed by: FAMILY MEDICINE

## 2021-08-25 PROCEDURE — 99999 PR PBB SHADOW E&M-EST. PATIENT-LVL III: ICD-10-PCS | Mod: PBBFAC,,, | Performed by: FAMILY MEDICINE

## 2021-08-25 PROCEDURE — 99999 PR PBB SHADOW E&M-EST. PATIENT-LVL III: CPT | Mod: PBBFAC,,, | Performed by: FAMILY MEDICINE

## 2021-08-25 PROCEDURE — 99213 OFFICE O/P EST LOW 20 MIN: CPT | Mod: PBBFAC,PO | Performed by: FAMILY MEDICINE

## 2021-08-25 PROCEDURE — 99214 OFFICE O/P EST MOD 30 MIN: CPT | Mod: S$PBB,,, | Performed by: FAMILY MEDICINE

## 2021-08-25 RX ORDER — GABAPENTIN 300 MG/1
300 CAPSULE ORAL NIGHTLY
Qty: 90 CAPSULE | Refills: 3 | Status: SHIPPED | OUTPATIENT
Start: 2021-08-25 | End: 2022-05-10 | Stop reason: SDUPTHER

## 2021-08-28 ENCOUNTER — LAB VISIT (OUTPATIENT)
Dept: LAB | Facility: HOSPITAL | Age: 66
End: 2021-08-28
Attending: FAMILY MEDICINE
Payer: MEDICAID

## 2021-08-28 DIAGNOSIS — E11.9 TYPE 2 DIABETES MELLITUS WITHOUT COMPLICATION: ICD-10-CM

## 2021-08-28 LAB
ALBUMIN SERPL BCP-MCNC: 3.9 G/DL (ref 3.5–5.2)
ALP SERPL-CCNC: 157 U/L (ref 55–135)
ALT SERPL W/O P-5'-P-CCNC: 22 U/L (ref 10–44)
ANION GAP SERPL CALC-SCNC: 9 MMOL/L (ref 8–16)
AST SERPL-CCNC: 34 U/L (ref 10–40)
BASOPHILS # BLD AUTO: 0.06 K/UL (ref 0–0.2)
BASOPHILS NFR BLD: 0.5 % (ref 0–1.9)
BILIRUB SERPL-MCNC: 2.5 MG/DL (ref 0.1–1)
BUN SERPL-MCNC: 13 MG/DL (ref 8–23)
CALCIUM SERPL-MCNC: 9 MG/DL (ref 8.7–10.5)
CHLORIDE SERPL-SCNC: 104 MMOL/L (ref 95–110)
CHOLEST SERPL-MCNC: 112 MG/DL (ref 120–199)
CHOLEST SERPL-MCNC: 112 MG/DL (ref 120–199)
CHOLEST/HDLC SERPL: 3.5 {RATIO} (ref 2–5)
CHOLEST/HDLC SERPL: 3.5 {RATIO} (ref 2–5)
CO2 SERPL-SCNC: 21 MMOL/L (ref 23–29)
CREAT SERPL-MCNC: 1.3 MG/DL (ref 0.5–1.4)
DIFFERENTIAL METHOD: ABNORMAL
EOSINOPHIL # BLD AUTO: 0.5 K/UL (ref 0–0.5)
EOSINOPHIL NFR BLD: 4 % (ref 0–8)
ERYTHROCYTE [DISTWIDTH] IN BLOOD BY AUTOMATED COUNT: 15.4 % (ref 11.5–14.5)
EST. GFR  (AFRICAN AMERICAN): >60 ML/MIN/1.73 M^2
EST. GFR  (NON AFRICAN AMERICAN): 57.7 ML/MIN/1.73 M^2
ESTIMATED AVG GLUCOSE: 203 MG/DL (ref 68–131)
GLUCOSE SERPL-MCNC: 361 MG/DL (ref 70–110)
HBA1C MFR BLD: 8.7 % (ref 4–5.6)
HCT VFR BLD AUTO: 36.2 % (ref 40–54)
HDLC SERPL-MCNC: 32 MG/DL (ref 40–75)
HDLC SERPL-MCNC: 32 MG/DL (ref 40–75)
HDLC SERPL: 28.6 % (ref 20–50)
HDLC SERPL: 28.6 % (ref 20–50)
HGB BLD-MCNC: 12.6 G/DL (ref 14–18)
IMM GRANULOCYTES # BLD AUTO: 0.04 K/UL (ref 0–0.04)
IMM GRANULOCYTES NFR BLD AUTO: 0.3 % (ref 0–0.5)
LDLC SERPL CALC-MCNC: 32.8 MG/DL (ref 63–159)
LDLC SERPL CALC-MCNC: 32.8 MG/DL (ref 63–159)
LYMPHOCYTES # BLD AUTO: 3.1 K/UL (ref 1–4.8)
LYMPHOCYTES NFR BLD: 24.9 % (ref 18–48)
MCH RBC QN AUTO: 27.1 PG (ref 27–31)
MCHC RBC AUTO-ENTMCNC: 34.8 G/DL (ref 32–36)
MCV RBC AUTO: 78 FL (ref 82–98)
MONOCYTES # BLD AUTO: 1 K/UL (ref 0.3–1)
MONOCYTES NFR BLD: 7.9 % (ref 4–15)
NEUTROPHILS # BLD AUTO: 7.8 K/UL (ref 1.8–7.7)
NEUTROPHILS NFR BLD: 62.4 % (ref 38–73)
NONHDLC SERPL-MCNC: 80 MG/DL
NONHDLC SERPL-MCNC: 80 MG/DL
NRBC BLD-RTO: 4 /100 WBC
PLATELET # BLD AUTO: 273 K/UL (ref 150–450)
PMV BLD AUTO: 12.4 FL (ref 9.2–12.9)
POTASSIUM SERPL-SCNC: 4.6 MMOL/L (ref 3.5–5.1)
PROT SERPL-MCNC: 7.9 G/DL (ref 6–8.4)
RBC # BLD AUTO: 4.65 M/UL (ref 4.6–6.2)
SODIUM SERPL-SCNC: 134 MMOL/L (ref 136–145)
TRIGL SERPL-MCNC: 236 MG/DL (ref 30–150)
TRIGL SERPL-MCNC: 236 MG/DL (ref 30–150)
TSH SERPL DL<=0.005 MIU/L-ACNC: 0.92 UIU/ML (ref 0.4–4)
WBC # BLD AUTO: 12.43 K/UL (ref 3.9–12.7)

## 2021-08-28 PROCEDURE — 80061 LIPID PANEL: CPT | Performed by: FAMILY MEDICINE

## 2021-08-28 PROCEDURE — 36415 COLL VENOUS BLD VENIPUNCTURE: CPT | Mod: PO | Performed by: FAMILY MEDICINE

## 2021-08-28 PROCEDURE — 85025 COMPLETE CBC W/AUTO DIFF WBC: CPT | Performed by: FAMILY MEDICINE

## 2021-08-28 PROCEDURE — 80053 COMPREHEN METABOLIC PANEL: CPT | Performed by: FAMILY MEDICINE

## 2021-08-28 PROCEDURE — 83036 HEMOGLOBIN GLYCOSYLATED A1C: CPT | Performed by: FAMILY MEDICINE

## 2021-08-28 PROCEDURE — 84443 ASSAY THYROID STIM HORMONE: CPT | Performed by: FAMILY MEDICINE

## 2021-09-01 DIAGNOSIS — N18.30 TYPE 2 DIABETES MELLITUS WITH STAGE 3 CHRONIC KIDNEY DISEASE, WITHOUT LONG-TERM CURRENT USE OF INSULIN, UNSPECIFIED WHETHER STAGE 3A OR 3B CKD: Primary | ICD-10-CM

## 2021-09-01 DIAGNOSIS — E11.22 TYPE 2 DIABETES MELLITUS WITH STAGE 3 CHRONIC KIDNEY DISEASE, WITHOUT LONG-TERM CURRENT USE OF INSULIN, UNSPECIFIED WHETHER STAGE 3A OR 3B CKD: Primary | ICD-10-CM

## 2021-09-02 ENCOUNTER — TELEPHONE (OUTPATIENT)
Dept: FAMILY MEDICINE | Facility: CLINIC | Age: 66
End: 2021-09-02

## 2021-09-03 ENCOUNTER — PATIENT OUTREACH (OUTPATIENT)
Dept: ADMINISTRATIVE | Facility: HOSPITAL | Age: 66
End: 2021-09-03

## 2021-10-07 ENCOUNTER — PATIENT MESSAGE (OUTPATIENT)
Dept: ADMINISTRATIVE | Facility: HOSPITAL | Age: 66
End: 2021-10-07

## 2021-10-18 ENCOUNTER — PATIENT MESSAGE (OUTPATIENT)
Dept: ADMINISTRATIVE | Facility: HOSPITAL | Age: 66
End: 2021-10-18
Payer: MEDICAID

## 2021-12-16 ENCOUNTER — PATIENT OUTREACH (OUTPATIENT)
Dept: ADMINISTRATIVE | Facility: HOSPITAL | Age: 66
End: 2021-12-16
Payer: MEDICAID

## 2022-02-02 DIAGNOSIS — E11.9 TYPE 2 DIABETES MELLITUS WITHOUT COMPLICATION, UNSPECIFIED WHETHER LONG TERM INSULIN USE: ICD-10-CM

## 2022-03-11 ENCOUNTER — PATIENT OUTREACH (OUTPATIENT)
Dept: ADMINISTRATIVE | Facility: HOSPITAL | Age: 67
End: 2022-03-11
Payer: MEDICAID

## 2022-03-11 NOTE — PROGRESS NOTES
Diabetic eye exam GAP report-I spoke to pt regarding his overdue Diabetic eye exam and he stated he will schedule an appt.

## 2022-04-20 ENCOUNTER — PATIENT OUTREACH (OUTPATIENT)
Dept: ADMINISTRATIVE | Facility: OTHER | Age: 67
End: 2022-04-20
Payer: MEDICAID

## 2022-04-20 NOTE — PROGRESS NOTES
Health Maintenance Due   Topic Date Due    HIV Screening  Never done    TETANUS VACCINE  Never done    Low Dose Statin  Never done    Pneumococcal Vaccines (Age 65+) (2 - PCV) 10/13/2017    Shingles Vaccine (2 of 2) 02/14/2020    Eye Exam  06/03/2020    Influenza Vaccine (1) 09/01/2021    Hemoglobin A1c  11/28/2021     Updates were requested from care everywhere.  Chart was reviewed for overdue Proactive [facility] Encounters ([facility]) topics (CRS, Breast Cancer Screening, Eye exam)  Health Maintenance has been updated.  LINKS immunization registry triggered.  Immunizations were reconciled.

## 2022-05-10 ENCOUNTER — OFFICE VISIT (OUTPATIENT)
Dept: FAMILY MEDICINE | Facility: CLINIC | Age: 67
End: 2022-05-10
Payer: MEDICAID

## 2022-05-10 ENCOUNTER — LAB VISIT (OUTPATIENT)
Dept: LAB | Facility: HOSPITAL | Age: 67
End: 2022-05-10
Attending: FAMILY MEDICINE
Payer: MEDICAID

## 2022-05-10 ENCOUNTER — PATIENT MESSAGE (OUTPATIENT)
Dept: ADMINISTRATIVE | Facility: HOSPITAL | Age: 67
End: 2022-05-10
Payer: MEDICAID

## 2022-05-10 VITALS
SYSTOLIC BLOOD PRESSURE: 118 MMHG | BODY MASS INDEX: 28.09 KG/M2 | WEIGHT: 196.19 LBS | OXYGEN SATURATION: 98 % | HEIGHT: 70 IN | DIASTOLIC BLOOD PRESSURE: 76 MMHG | HEART RATE: 74 BPM

## 2022-05-10 DIAGNOSIS — E11.22 TYPE 2 DIABETES MELLITUS WITH STAGE 3 CHRONIC KIDNEY DISEASE, WITHOUT LONG-TERM CURRENT USE OF INSULIN, UNSPECIFIED WHETHER STAGE 3A OR 3B CKD: Primary | ICD-10-CM

## 2022-05-10 DIAGNOSIS — Z59.89 INSURANCE COVERAGE PROBLEMS: ICD-10-CM

## 2022-05-10 DIAGNOSIS — E11.22 TYPE 2 DIABETES MELLITUS WITH STAGE 3 CHRONIC KIDNEY DISEASE, WITHOUT LONG-TERM CURRENT USE OF INSULIN, UNSPECIFIED WHETHER STAGE 3A OR 3B CKD: ICD-10-CM

## 2022-05-10 DIAGNOSIS — N18.30 TYPE 2 DIABETES MELLITUS WITH STAGE 3 CHRONIC KIDNEY DISEASE, WITHOUT LONG-TERM CURRENT USE OF INSULIN, UNSPECIFIED WHETHER STAGE 3A OR 3B CKD: Primary | ICD-10-CM

## 2022-05-10 DIAGNOSIS — D57.1 HB-SS DISEASE WITHOUT CRISIS: ICD-10-CM

## 2022-05-10 DIAGNOSIS — G62.9 NEUROPATHY: ICD-10-CM

## 2022-05-10 DIAGNOSIS — N18.30 TYPE 2 DIABETES MELLITUS WITH STAGE 3 CHRONIC KIDNEY DISEASE, WITHOUT LONG-TERM CURRENT USE OF INSULIN, UNSPECIFIED WHETHER STAGE 3A OR 3B CKD: ICD-10-CM

## 2022-05-10 LAB
ALBUMIN SERPL BCP-MCNC: 3.8 G/DL (ref 3.5–5.2)
ALBUMIN SERPL BCP-MCNC: 3.8 G/DL (ref 3.5–5.2)
ALP SERPL-CCNC: 102 U/L (ref 55–135)
ALP SERPL-CCNC: 102 U/L (ref 55–135)
ALT SERPL W/O P-5'-P-CCNC: 14 U/L (ref 10–44)
ALT SERPL W/O P-5'-P-CCNC: 14 U/L (ref 10–44)
ANION GAP SERPL CALC-SCNC: 11 MMOL/L (ref 8–16)
ANION GAP SERPL CALC-SCNC: 11 MMOL/L (ref 8–16)
AST SERPL-CCNC: 20 U/L (ref 10–40)
AST SERPL-CCNC: 20 U/L (ref 10–40)
BILIRUB SERPL-MCNC: 2.7 MG/DL (ref 0.1–1)
BILIRUB SERPL-MCNC: 2.7 MG/DL (ref 0.1–1)
BUN SERPL-MCNC: 12 MG/DL (ref 8–23)
BUN SERPL-MCNC: 12 MG/DL (ref 8–23)
CALCIUM SERPL-MCNC: 9.1 MG/DL (ref 8.7–10.5)
CALCIUM SERPL-MCNC: 9.1 MG/DL (ref 8.7–10.5)
CHLORIDE SERPL-SCNC: 108 MMOL/L (ref 95–110)
CHLORIDE SERPL-SCNC: 108 MMOL/L (ref 95–110)
CO2 SERPL-SCNC: 20 MMOL/L (ref 23–29)
CO2 SERPL-SCNC: 20 MMOL/L (ref 23–29)
CREAT SERPL-MCNC: 1.4 MG/DL (ref 0.5–1.4)
CREAT SERPL-MCNC: 1.4 MG/DL (ref 0.5–1.4)
EST. GFR  (AFRICAN AMERICAN): >60 ML/MIN/1.73 M^2
EST. GFR  (AFRICAN AMERICAN): >60 ML/MIN/1.73 M^2
EST. GFR  (NON AFRICAN AMERICAN): 52.4 ML/MIN/1.73 M^2
EST. GFR  (NON AFRICAN AMERICAN): 52.4 ML/MIN/1.73 M^2
ESTIMATED AVG GLUCOSE: 128 MG/DL (ref 68–131)
ESTIMATED AVG GLUCOSE: 128 MG/DL (ref 68–131)
GLUCOSE SERPL-MCNC: 198 MG/DL (ref 70–110)
GLUCOSE SERPL-MCNC: 198 MG/DL (ref 70–110)
HBA1C MFR BLD: 6.1 % (ref 4–5.6)
HBA1C MFR BLD: 6.1 % (ref 4–5.6)
POTASSIUM SERPL-SCNC: 4.8 MMOL/L (ref 3.5–5.1)
POTASSIUM SERPL-SCNC: 4.8 MMOL/L (ref 3.5–5.1)
PROT SERPL-MCNC: 7.6 G/DL (ref 6–8.4)
PROT SERPL-MCNC: 7.6 G/DL (ref 6–8.4)
SODIUM SERPL-SCNC: 139 MMOL/L (ref 136–145)
SODIUM SERPL-SCNC: 139 MMOL/L (ref 136–145)

## 2022-05-10 PROCEDURE — 1159F MED LIST DOCD IN RCRD: CPT | Mod: CPTII,,, | Performed by: FAMILY MEDICINE

## 2022-05-10 PROCEDURE — 3288F PR FALLS RISK ASSESSMENT DOCUMENTED: ICD-10-PCS | Mod: CPTII,,, | Performed by: FAMILY MEDICINE

## 2022-05-10 PROCEDURE — 99999 PR PBB SHADOW E&M-EST. PATIENT-LVL III: ICD-10-PCS | Mod: PBBFAC,,, | Performed by: FAMILY MEDICINE

## 2022-05-10 PROCEDURE — 1101F PT FALLS ASSESS-DOCD LE1/YR: CPT | Mod: CPTII,,, | Performed by: FAMILY MEDICINE

## 2022-05-10 PROCEDURE — 1159F PR MEDICATION LIST DOCUMENTED IN MEDICAL RECORD: ICD-10-PCS | Mod: CPTII,,, | Performed by: FAMILY MEDICINE

## 2022-05-10 PROCEDURE — 99999 PR PBB SHADOW E&M-EST. PATIENT-LVL III: CPT | Mod: PBBFAC,,, | Performed by: FAMILY MEDICINE

## 2022-05-10 PROCEDURE — 3052F HG A1C>EQUAL 8.0%<EQUAL 9.0%: CPT | Mod: CPTII,,, | Performed by: FAMILY MEDICINE

## 2022-05-10 PROCEDURE — 3052F PR MOST RECENT HEMOGLOBIN A1C LEVEL 8.0 - < 9.0%: ICD-10-PCS | Mod: CPTII,,, | Performed by: FAMILY MEDICINE

## 2022-05-10 PROCEDURE — 80053 COMPREHEN METABOLIC PANEL: CPT | Performed by: FAMILY MEDICINE

## 2022-05-10 PROCEDURE — 3008F PR BODY MASS INDEX (BMI) DOCUMENTED: ICD-10-PCS | Mod: CPTII,,, | Performed by: FAMILY MEDICINE

## 2022-05-10 PROCEDURE — 3008F BODY MASS INDEX DOCD: CPT | Mod: CPTII,,, | Performed by: FAMILY MEDICINE

## 2022-05-10 PROCEDURE — 1101F PR PT FALLS ASSESS DOC 0-1 FALLS W/OUT INJ PAST YR: ICD-10-PCS | Mod: CPTII,,, | Performed by: FAMILY MEDICINE

## 2022-05-10 PROCEDURE — 36415 COLL VENOUS BLD VENIPUNCTURE: CPT | Mod: PO | Performed by: FAMILY MEDICINE

## 2022-05-10 PROCEDURE — 3074F PR MOST RECENT SYSTOLIC BLOOD PRESSURE < 130 MM HG: ICD-10-PCS | Mod: CPTII,,, | Performed by: FAMILY MEDICINE

## 2022-05-10 PROCEDURE — 3078F DIAST BP <80 MM HG: CPT | Mod: CPTII,,, | Performed by: FAMILY MEDICINE

## 2022-05-10 PROCEDURE — 83036 HEMOGLOBIN GLYCOSYLATED A1C: CPT | Performed by: FAMILY MEDICINE

## 2022-05-10 PROCEDURE — 99213 OFFICE O/P EST LOW 20 MIN: CPT | Mod: PBBFAC,PO | Performed by: FAMILY MEDICINE

## 2022-05-10 PROCEDURE — 3074F SYST BP LT 130 MM HG: CPT | Mod: CPTII,,, | Performed by: FAMILY MEDICINE

## 2022-05-10 PROCEDURE — 1126F AMNT PAIN NOTED NONE PRSNT: CPT | Mod: CPTII,,, | Performed by: FAMILY MEDICINE

## 2022-05-10 PROCEDURE — 3078F PR MOST RECENT DIASTOLIC BLOOD PRESSURE < 80 MM HG: ICD-10-PCS | Mod: CPTII,,, | Performed by: FAMILY MEDICINE

## 2022-05-10 PROCEDURE — 1126F PR PAIN SEVERITY QUANTIFIED, NO PAIN PRESENT: ICD-10-PCS | Mod: CPTII,,, | Performed by: FAMILY MEDICINE

## 2022-05-10 PROCEDURE — 99214 OFFICE O/P EST MOD 30 MIN: CPT | Mod: S$PBB,,, | Performed by: FAMILY MEDICINE

## 2022-05-10 PROCEDURE — 3288F FALL RISK ASSESSMENT DOCD: CPT | Mod: CPTII,,, | Performed by: FAMILY MEDICINE

## 2022-05-10 PROCEDURE — 99214 PR OFFICE/OUTPT VISIT, EST, LEVL IV, 30-39 MIN: ICD-10-PCS | Mod: S$PBB,,, | Performed by: FAMILY MEDICINE

## 2022-05-10 RX ORDER — GABAPENTIN 300 MG/1
300 CAPSULE ORAL NIGHTLY
Qty: 90 CAPSULE | Refills: 3 | Status: SHIPPED | OUTPATIENT
Start: 2022-05-10 | End: 2023-01-30

## 2022-05-10 SDOH — SOCIAL DETERMINANTS OF HEALTH (SDOH): OTHER PROBLEMS RELATED TO HOUSING AND ECONOMIC CIRCUMSTANCES: Z59.89

## 2022-05-10 NOTE — PATIENT INSTRUCTIONS
Marv Linton,     If you are due for any health screening(s) below please notify me so we can arrange them to be ordered and scheduled to maintain your health. Most healthy patients complete it. Don't lose out on improving your health.     Health Maintenance   Topic Date Due    TETANUS VACCINE  Never done    Low Dose Statin  Never done    Eye Exam  06/03/2020    Hemoglobin A1c  11/28/2021    Foot Exam  08/25/2022    Lipid Panel  08/28/2022    Hepatitis C Screening  Completed          Colon Cancer Screening    Of cancers affecting both men and women, colorectal cancer is the third leading cancer killer in the [place]. But it doesnt have to be. Screening can prevent colorectal cancer or find it at an early stage when treatment often leads to a cure.    A colonoscopy is the preferred test for detecting colon cancer. It is needed only once every 10 years if results are negative. While sedated, a flexible, lighted tube with a tiny camera is inserted into the rectum and advanced through the colon to look for cancers. An alternative screening test that is used at home and returned to the lab may also be used. It detects hidden blood in bowel movements which could indicate cancer in the colon. If results are positive, you will need a colonoscopy to determine if the blood is a sign of cancer. This type of follow up (diagnostic) colonoscopy usually requires additional copays as required by your insurance provider. Please contact your PCP if you have any questions.    Although you are still overdue for this important screening, due to the COVID-19 pandemic, we recommend scheduling it in the near future.       Diabetic Retinal Eye Exam    Diabetes is the #1 cause of blindness in the [place] - early detection before signs or symptoms develop can prevent debilitating blindness.    Once-a-year screening is recommended for all diabetic patients. This exam can prevent and treat diabetes complications in the eye before developing  symptoms. This can be done with a special camera is used to take photographs of the back of your eye without having to dilate them, or you can see an eye doctor for a full dilated exam.    Although you are still overdue for this important screening, due to the COVID-19 pandemic, we recommend scheduling it in the near future.  Diabetic A1c Testing    Your chart identifies you as having diabetes. It is recommended that all diabetes patients have an A1c test done at least once a year, but [facility] recommends twice a year for most patients. This helps your doctor to better help you manage your diabetes. This is a non-fasting lab test which can be completed at any time. Your result will be sent to your Primary Care Provider for review and that office will contact you with the results.  A1c every 3-6 months, lipid panel every year, microalbumin (urine test) once per year, comprehensive foot exam once per year, dilated eye exam at least once per year, dental exam every 6 months, flu vaccination every year, and pneumonia vaccination(s) as recommended

## 2022-05-10 NOTE — PROGRESS NOTES
Subjective:   Patient ID: Royer Motley is a 66 y.o. male     Chief Complaint:Diabetes      Here for checkup. Reports issues with coverage on new insurance carrier. Pt otherwise doing well with no new complaints.    Review of Systems   Constitutional: Negative for chills and fever.   HENT: Negative for sore throat and trouble swallowing.    Respiratory: Negative for cough and shortness of breath.    Cardiovascular: Negative for chest pain and leg swelling.   Gastrointestinal: Negative for abdominal distention and abdominal pain.   Genitourinary: Negative for dysuria and flank pain.   Musculoskeletal: Negative for arthralgias and back pain.   Skin: Negative for color change and pallor.   Neurological: Negative for weakness and headaches.   Psychiatric/Behavioral: Negative for agitation and confusion.     Past Medical History:   Diagnosis Date    Avascular necrosis of hip     Diabetes mellitus, type 2     Enlarged prostate     Sickle cell hemoglobin C disease      Past Surgical History:   Procedure Laterality Date    ANKLE DEBRIDEMENT Right     CHOLECYSTECTOMY      MANDIBLE FRACTURE SURGERY       Objective:     Vitals:    05/10/22 1317   BP: 118/76   Pulse: 74     Body mass index is 28.15 kg/m².  Physical Exam  Vitals and nursing note reviewed.   Constitutional:       Appearance: He is well-developed.   HENT:      Head: Normocephalic and atraumatic.   Eyes:      General: No scleral icterus.     Conjunctiva/sclera: Conjunctivae normal.   Cardiovascular:      Heart sounds: No murmur heard.  Pulmonary:      Effort: Pulmonary effort is normal. No respiratory distress.   Musculoskeletal:         General: No deformity. Normal range of motion.      Cervical back: Normal range of motion and neck supple.   Skin:     Coloration: Skin is not pale.      Findings: No rash.   Neurological:      Mental Status: He is alert and oriented to person, place, and time.   Psychiatric:         Behavior: Behavior normal.          Thought Content: Thought content normal.         Judgment: Judgment normal.       Assessment:     1. Type 2 diabetes mellitus with stage 3 chronic kidney disease, without long-term current use of insulin, unspecified whether stage 3a or 3b CKD    2. Neuropathy    3. Insurance coverage problems    4. Hb-SS disease without crisis      Plan:   Type 2 diabetes mellitus with stage 3 chronic kidney disease, without long-term current use of insulin, unspecified whether stage 3a or 3b CKD  -     Hemoglobin A1C; Future; Expected date: 05/10/2022  -     Comprehensive Metabolic Panel; Future; Expected date: 05/10/2022    Neuropathy  -     gabapentin (NEURONTIN) 300 MG capsule; Take 1 capsule (300 mg total) by mouth every evening.  Dispense: 90 capsule; Refill: 3    Insurance coverage problems  -     Ambulatory referral/consult to Outpatient Case Management    Hb-SS disease without crisis  -     Ambulatory referral/consult to Pain Clinic; Future; Expected date: 05/17/2022  Reviewed labwork and no current concern for crisis clincally          Total time spent of Less than 30 minutes minutes on the day of the visit.This includes face to face time and preparing to see the patient, obtaining and reviewing separately obtained history, documenting clinical information in the electronic or other health record, independently interpreting results and communicating results to the patient/family/caregiver, or care coordinator.    Nikko Agarwal MD  05/10/2022    Portions of this note have been dictated with MARÍA ELENA Patterson

## 2022-05-27 ENCOUNTER — PATIENT OUTREACH (OUTPATIENT)
Dept: ADMINISTRATIVE | Facility: OTHER | Age: 67
End: 2022-05-27

## 2022-05-27 NOTE — PROGRESS NOTES
CHW - Initial Contact    Completed the Social Determinant of Health questionnaire with Royer RING via telephone today.    Pt identified barriers of most importance are: Transportation  Referrals to community agencies completed with patient/caregiver consent outside of [facility] include: N/A  Referrals were put through [facility] - Yes  Other information discussed the patient needs / wants help with: Food stamps and Utility bill payment assistant.   Follow-up Outreach - Due: 6/16/2022

## 2022-06-23 NOTE — PROGRESS NOTES
CHW - Follow Up    Completed a follow up visit with Mr Motley via telephone today.  Pt reported: doing just find, but hasn't heard from [facility]  Community Health Worker provided: [place] on Aging phone number for food, electric bill payment assistance and transportation  Follow-up Outreach - Due: 7/19/2022

## 2022-07-26 ENCOUNTER — PATIENT OUTREACH (OUTPATIENT)
Dept: ADMINISTRATIVE | Facility: OTHER | Age: 67
End: 2022-07-26
Payer: MEDICAID

## 2022-07-26 NOTE — PROGRESS NOTES
CHW - Follow Up    Completed a follow up visit with Mr Motley via telephone today.  Pt reported: doing just fine, will call me when he gets home to get the [facility] Aging phone number that he got from [facility].  Community Health Worker provided: Will follow up if I don't hear from him.   Follow-up Outreach - Due: 8/8

## 2022-08-01 ENCOUNTER — PATIENT MESSAGE (OUTPATIENT)
Dept: ADMINISTRATIVE | Facility: HOSPITAL | Age: 67
End: 2022-08-01
Payer: MEDICAID

## 2022-08-10 ENCOUNTER — PATIENT OUTREACH (OUTPATIENT)
Dept: ADMINISTRATIVE | Facility: HOSPITAL | Age: 67
End: 2022-08-10
Payer: MEDICAID

## 2022-08-10 NOTE — PROGRESS NOTES
CHW - Follow Up     Completed a follow up visit with Mr Motley via telephone today.  Pt reported: doing just fine, needed [facility] on Aging number again.   Community Health Worker provided: Will follow up if I don't hear from him.   Follow up: 8/17

## 2022-08-10 NOTE — PROGRESS NOTES
Diabetic eye exam GAP report-I spoke with pt and he stated he will schedule an eye exam at [facility].

## 2022-08-19 NOTE — PROGRESS NOTES
CHW - Follow Up      Completed a follow up visit with Mr Motley via telephone today.  Pt reported: doing just fine and called [facility] on Aging. Received all resources needed and very happy! Now looking for someone to assist cleaning.    Community Health Worker provided: Told Mr Motley that that same number could assist him, but would also submit a resource if need be. We agreed to a follow up call after Labor day    Follow-up Outreach - Due: 9/9/2022

## 2022-08-24 ENCOUNTER — PATIENT MESSAGE (OUTPATIENT)
Dept: ADMINISTRATIVE | Facility: HOSPITAL | Age: 67
End: 2022-08-24
Payer: MEDICARE

## 2022-08-31 DIAGNOSIS — E11.9 TYPE 2 DIABETES MELLITUS WITHOUT COMPLICATION: ICD-10-CM

## 2022-09-01 ENCOUNTER — PATIENT OUTREACH (OUTPATIENT)
Dept: ADMINISTRATIVE | Facility: HOSPITAL | Age: 67
End: 2022-09-01
Payer: MEDICARE

## 2022-09-01 NOTE — LETTER
AUTHORIZATION FOR RELEASE OF   CONFIDENTIAL INFORMATION    Dear [facility],    We are seeing Royer Motley, date of birth 1955, in the clinic at [facility]. Nikko Agarwal MD is the patient's PCP. Royer Motley has an outstanding lab/procedure at the time we reviewed his chart. In order to help keep his health information updated, he has authorized us to request the following medical record(s):        (  )  MAMMOGRAM                                      (  )  COLONOSCOPY      (  )  PAP SMEAR                                          (  )  OUTSIDE LAB RESULTS     (  )  DEXA SCAN                                          ( X )  EYE EXAM            (  )  FOOT EXAM                                          (  )  ENTIRE RECORD     (  )  OUTSIDE IMMUNIZATIONS                 (  )  _______________         Please fax records to [facility], Joseph Oschwald, MD at [phone]     Thanks so much and have a great day!    Moriah Jamison LPN [facility]  [address] [place] [address]  - [phone]  F [phone]          Patient Name: Royer Motley  : 1955  Patient Phone #: [phone]

## 2022-09-06 ENCOUNTER — PATIENT MESSAGE (OUTPATIENT)
Dept: ADMINISTRATIVE | Facility: HOSPITAL | Age: 67
End: 2022-09-06
Payer: MEDICARE

## 2022-09-12 ENCOUNTER — PATIENT OUTREACH (OUTPATIENT)
Dept: ADMINISTRATIVE | Facility: OTHER | Age: 67
End: 2022-09-12
Payer: MEDICARE

## 2022-09-12 NOTE — PROGRESS NOTES
CHW - Follow Up        Completed a follow up visit with Mr Motley via telephone today.  Pt reported: doing great and looking for someone to assist cleaning.    Community Health Worker: encourage Mr. Motley to contact [place] [facility] and will follow up with Gabriela from  regarding referral for cleaning.. We agreed to a follow up call on the 27th.     Follow-up Outreach - Due: 9/27/2022

## 2022-09-19 ENCOUNTER — PATIENT OUTREACH (OUTPATIENT)
Dept: ADMINISTRATIVE | Facility: HOSPITAL | Age: 67
End: 2022-09-19
Payer: MEDICARE

## 2022-09-19 NOTE — LETTER
September 19, 2022    Royer Motley  [address] [place] [address]             [facility]  [address]  [place] [address]  Phone: [phone] Dear Mr. Motley,    Good morning!!    I am reaching out to you because you are over due for a Diabetic eye exam. Have you had an eye exam in the last year,  if so where did you have it? If not,  would you like me to schedule you an eye exam with one of our Physician's at [facility]?    Thanks so much and have a great day!    Moriah GIBSON LPN [facility]  [facility]  [address] [place] [address]  P- [phone]  F- [phone]

## 2022-09-19 NOTE — PROGRESS NOTES
Diabetic eye exam GAP report-Left VM and letter mailed out regarding pt's overdue Diabetic eye exam.

## 2022-09-27 ENCOUNTER — PATIENT OUTREACH (OUTPATIENT)
Dept: ADMINISTRATIVE | Facility: OTHER | Age: 67
End: 2022-09-27
Payer: MEDICARE

## 2022-09-27 NOTE — PROGRESS NOTES
CHW - Follow Up       Completed a follow up visit with Mr Motley via telephone today.  Pt reported: found someone to help start cleaning. Still playing bingo with other people met through [employer] [place] [facility]. Doing great.   Pt denied any additional needs at this time and agrees with episode closure at this time.    Provided patient with my contact information and encouraged him to contact me if additional needs arise.

## 2022-10-11 ENCOUNTER — PATIENT MESSAGE (OUTPATIENT)
Dept: ADMINISTRATIVE | Facility: HOSPITAL | Age: 67
End: 2022-10-11
Payer: MEDICARE

## 2022-11-02 DIAGNOSIS — E11.9 TYPE 2 DIABETES MELLITUS WITHOUT COMPLICATION: ICD-10-CM

## 2022-11-04 DIAGNOSIS — E11.42 TYPE 2 DIABETES MELLITUS WITH DIABETIC POLYNEUROPATHY: ICD-10-CM

## 2022-11-04 RX ORDER — METFORMIN HYDROCHLORIDE 500 MG/1
500 TABLET, EXTENDED RELEASE ORAL 2 TIMES DAILY WITH MEALS
Qty: 180 TABLET | Refills: 0 | Status: SHIPPED | OUTPATIENT
Start: 2022-11-04 | End: 2023-05-01

## 2022-11-04 NOTE — TELEPHONE ENCOUNTER
Refill Decision Note   Royer Motley  is requesting a refill authorization.  Brief Assessment and Rationale for Refill:  Approve    -Medication-Related Problems Identified: Requires labs  Medication Therapy Plan:       Medication Reconciliation Completed: No   Comments:     Provider Staff:     Action is required for this patient.   Please see care gap opportunities below in Care Due Message.     Thanks!  [facility]     Appointments      Date Provider   Last Visit   5/10/2022 Nikko Agarwal MD   Next Visit   11/15/2022 Nikko Agarwal MD     Note composed:2:13 PM 11/04/2022           Note composed:2:13 PM 11/04/2022

## 2022-11-04 NOTE — TELEPHONE ENCOUNTER
Care Due:                  Date            Visit Type   Department     Provider  --------------------------------------------------------------------------------                                EP -                              PRIMARY      [facility]  Last Visit: 05-      CARE ([facility])   CLARA Agarwal                              EP -                              PRIMARY      [facility]  Next Visit: 11-      CARE ([facility])   CLARA Agarwal                                                            Last  Test          Frequency    Reason                     Performed    Due Date  --------------------------------------------------------------------------------    HBA1C.......  6 months...  glimepiride, metFORMIN...  05- 11-    [facility] Embedded Care Gaps. Reference number: [id number]. 11/04/2022   10:52:22 AM CDT

## 2022-11-07 ENCOUNTER — PATIENT MESSAGE (OUTPATIENT)
Dept: ADMINISTRATIVE | Facility: HOSPITAL | Age: 67
End: 2022-11-07
Payer: MEDICARE

## 2022-11-21 ENCOUNTER — PATIENT MESSAGE (OUTPATIENT)
Dept: ADMINISTRATIVE | Facility: HOSPITAL | Age: 67
End: 2022-11-21
Payer: MEDICARE

## 2022-11-23 DIAGNOSIS — E11.9 TYPE 2 DIABETES MELLITUS WITHOUT COMPLICATION: ICD-10-CM

## 2022-11-28 ENCOUNTER — OFFICE VISIT (OUTPATIENT)
Dept: FAMILY MEDICINE | Facility: CLINIC | Age: 67
End: 2022-11-28
Payer: MEDICARE

## 2022-11-28 VITALS
TEMPERATURE: 98 F | HEART RATE: 71 BPM | DIASTOLIC BLOOD PRESSURE: 72 MMHG | WEIGHT: 198.19 LBS | BODY MASS INDEX: 28.37 KG/M2 | OXYGEN SATURATION: 97 % | SYSTOLIC BLOOD PRESSURE: 130 MMHG | HEIGHT: 70 IN

## 2022-11-28 DIAGNOSIS — E11.22 TYPE 2 DIABETES MELLITUS WITH STAGE 3 CHRONIC KIDNEY DISEASE, WITHOUT LONG-TERM CURRENT USE OF INSULIN, UNSPECIFIED WHETHER STAGE 3A OR 3B CKD: Primary | ICD-10-CM

## 2022-11-28 DIAGNOSIS — N18.30 TYPE 2 DIABETES MELLITUS WITH STAGE 3 CHRONIC KIDNEY DISEASE, WITHOUT LONG-TERM CURRENT USE OF INSULIN, UNSPECIFIED WHETHER STAGE 3A OR 3B CKD: Primary | ICD-10-CM

## 2022-11-28 DIAGNOSIS — Z12.11 SCREEN FOR COLON CANCER: ICD-10-CM

## 2022-11-28 PROCEDURE — 99499 UNLISTED E&M SERVICE: CPT | Mod: S$GLB,,, | Performed by: PHYSICIAN ASSISTANT

## 2022-11-28 PROCEDURE — 3008F BODY MASS INDEX DOCD: CPT | Mod: CPTII,S$GLB,, | Performed by: PHYSICIAN ASSISTANT

## 2022-11-28 PROCEDURE — G0009 ADMIN PNEUMOCOCCAL VACCINE: HCPCS | Mod: PBBFAC,PO

## 2022-11-28 PROCEDURE — 99214 OFFICE O/P EST MOD 30 MIN: CPT | Mod: PBBFAC,PO | Performed by: PHYSICIAN ASSISTANT

## 2022-11-28 PROCEDURE — 99213 PR OFFICE/OUTPT VISIT, EST, LEVL III, 20-29 MIN: ICD-10-PCS | Mod: S$GLB,,, | Performed by: PHYSICIAN ASSISTANT

## 2022-11-28 PROCEDURE — 99499 RISK ADDL DX/OHS AUDIT: ICD-10-PCS | Mod: S$GLB,,, | Performed by: PHYSICIAN ASSISTANT

## 2022-11-28 PROCEDURE — 1159F PR MEDICATION LIST DOCUMENTED IN MEDICAL RECORD: ICD-10-PCS | Mod: CPTII,S$GLB,, | Performed by: PHYSICIAN ASSISTANT

## 2022-11-28 PROCEDURE — 1126F PR PAIN SEVERITY QUANTIFIED, NO PAIN PRESENT: ICD-10-PCS | Mod: CPTII,S$GLB,, | Performed by: PHYSICIAN ASSISTANT

## 2022-11-28 PROCEDURE — 99213 OFFICE O/P EST LOW 20 MIN: CPT | Mod: S$GLB,,, | Performed by: PHYSICIAN ASSISTANT

## 2022-11-28 PROCEDURE — 3044F HG A1C LEVEL LT 7.0%: CPT | Mod: CPTII,S$GLB,, | Performed by: PHYSICIAN ASSISTANT

## 2022-11-28 PROCEDURE — 1126F AMNT PAIN NOTED NONE PRSNT: CPT | Mod: CPTII,S$GLB,, | Performed by: PHYSICIAN ASSISTANT

## 2022-11-28 PROCEDURE — 3288F FALL RISK ASSESSMENT DOCD: CPT | Mod: CPTII,S$GLB,, | Performed by: PHYSICIAN ASSISTANT

## 2022-11-28 PROCEDURE — 3044F PR MOST RECENT HEMOGLOBIN A1C LEVEL <7.0%: ICD-10-PCS | Mod: CPTII,S$GLB,, | Performed by: PHYSICIAN ASSISTANT

## 2022-11-28 PROCEDURE — 3288F PR FALLS RISK ASSESSMENT DOCUMENTED: ICD-10-PCS | Mod: CPTII,S$GLB,, | Performed by: PHYSICIAN ASSISTANT

## 2022-11-28 PROCEDURE — 3075F SYST BP GE 130 - 139MM HG: CPT | Mod: CPTII,S$GLB,, | Performed by: PHYSICIAN ASSISTANT

## 2022-11-28 PROCEDURE — 99999 PR PBB SHADOW E&M-EST. PATIENT-LVL IV: CPT | Mod: PBBFAC,,, | Performed by: PHYSICIAN ASSISTANT

## 2022-11-28 PROCEDURE — 3075F PR MOST RECENT SYSTOLIC BLOOD PRESS GE 130-139MM HG: ICD-10-PCS | Mod: CPTII,S$GLB,, | Performed by: PHYSICIAN ASSISTANT

## 2022-11-28 PROCEDURE — 3078F PR MOST RECENT DIASTOLIC BLOOD PRESSURE < 80 MM HG: ICD-10-PCS | Mod: CPTII,S$GLB,, | Performed by: PHYSICIAN ASSISTANT

## 2022-11-28 PROCEDURE — 1101F PR PT FALLS ASSESS DOC 0-1 FALLS W/OUT INJ PAST YR: ICD-10-PCS | Mod: CPTII,S$GLB,, | Performed by: PHYSICIAN ASSISTANT

## 2022-11-28 PROCEDURE — 99999 PR PBB SHADOW E&M-EST. PATIENT-LVL IV: ICD-10-PCS | Mod: PBBFAC,,, | Performed by: PHYSICIAN ASSISTANT

## 2022-11-28 PROCEDURE — 1101F PT FALLS ASSESS-DOCD LE1/YR: CPT | Mod: CPTII,S$GLB,, | Performed by: PHYSICIAN ASSISTANT

## 2022-11-28 PROCEDURE — 1159F MED LIST DOCD IN RCRD: CPT | Mod: CPTII,S$GLB,, | Performed by: PHYSICIAN ASSISTANT

## 2022-11-28 PROCEDURE — 3008F PR BODY MASS INDEX (BMI) DOCUMENTED: ICD-10-PCS | Mod: CPTII,S$GLB,, | Performed by: PHYSICIAN ASSISTANT

## 2022-11-28 PROCEDURE — 3078F DIAST BP <80 MM HG: CPT | Mod: CPTII,S$GLB,, | Performed by: PHYSICIAN ASSISTANT

## 2022-11-28 NOTE — PROGRESS NOTES
Identified pts name and , Prevnar 13 vaccine administered IM, pt tolerated well. Aseptic technique maintained. Pain scale 0 out of 10 on pain scale. Pt instructed to wait in clinic for 15 minutes after injection was given. Verbalized understanding.

## 2022-11-28 NOTE — PROGRESS NOTES
Subjective:       Patient ID: Royer Motley is a 67 y.o. male.    Chief Complaint: Follow-up    Patient with type 2 diabetes presents for routine follow up .    He has no complaints.   Patients patient medical/surgical, social and family histories have been reviewed       Review of Systems   Constitutional:  Negative for activity change, appetite change, fatigue and unexpected weight change.   Respiratory:  Negative for cough, chest tightness and shortness of breath.    Cardiovascular:  Negative for chest pain, palpitations and leg swelling.   Gastrointestinal:  Negative for abdominal pain, anal bleeding, blood in stool, constipation, diarrhea and nausea.   Endocrine: Negative for polydipsia and polyuria.   Genitourinary:  Negative for difficulty urinating, frequency, hematuria and urgency.   Musculoskeletal:  Negative for arthralgias.   Skin:  Negative for rash.   Neurological:  Negative for dizziness and headaches.   Psychiatric/Behavioral:  Negative for dysphoric mood. The patient is not nervous/anxious.      Objective:      Physical Exam  Constitutional:       General: He is not in acute distress.     Appearance: He is well-developed.   HENT:      Head: Normocephalic and atraumatic.   Cardiovascular:      Rate and Rhythm: Normal rate and regular rhythm.      Pulses:           Dorsalis pedis pulses are 2+ on the right side and 2+ on the left side.      Heart sounds: Normal heart sounds.   Pulmonary:      Effort: Pulmonary effort is normal.      Breath sounds: Normal breath sounds.   Musculoskeletal:      Right lower leg: No edema.      Left lower leg: No edema.      Right foot: No deformity.      Left foot: No deformity.   Feet:      Right foot:      Protective Sensation: 5 sites tested.  5 sites sensed.      Skin integrity: Skin integrity normal.      Toenail Condition: Right toenails are normal.      Left foot:      Protective Sensation: 5 sites tested.  5 sites sensed.      Skin integrity: Skin integrity  "normal.      Toenail Condition: Left toenails are normal.   Skin:     General: Skin is warm and dry.      Capillary Refill: Capillary refill takes less than 2 seconds.   Neurological:      Mental Status: He is alert.   Psychiatric:         Behavior: Behavior is cooperative.       Assessment:       1. Type 2 diabetes mellitus with stage 3 chronic kidney disease, without long-term current use of insulin, unspecified whether stage 3a or 3b CKD    2. Screen for colon cancer          Plan:       Royer was seen today for follow-up.    Diagnoses and all orders for this visit:    Type 2 diabetes mellitus with stage 3 chronic kidney disease, without long-term current use of insulin, unspecified whether stage 3a or 3b CKD  -     COMPREHENSIVE METABOLIC PANEL; Future  -     Diabetic Eye Screening Photo; Future  A1c and lipid as ordered   Continue current meds     Screen for colon cancer  -     Fecal Immunochemical Test (iFOBT); Future    Other orders  -     (In Office Administered) Pneumococcal Conjugate Vaccine (13 Valent) (IM)         Follow up for a1c, lipid, cmp, urine  soon, eye today , PCP in 3 mo .           Documentation entered by me for this encounter may have been done in part using speech-recognition technology. Although I have made an effort to ensure accuracy, "sound like" errors may exist and should be interpreted in context.     "

## 2022-11-29 ENCOUNTER — PATIENT MESSAGE (OUTPATIENT)
Dept: ADMINISTRATIVE | Facility: HOSPITAL | Age: 67
End: 2022-11-29
Payer: MEDICARE

## 2022-12-06 ENCOUNTER — LAB VISIT (OUTPATIENT)
Dept: LAB | Facility: HOSPITAL | Age: 67
End: 2022-12-06
Attending: FAMILY MEDICINE
Payer: MEDICARE

## 2022-12-06 ENCOUNTER — CLINICAL SUPPORT (OUTPATIENT)
Dept: FAMILY MEDICINE | Facility: CLINIC | Age: 67
End: 2022-12-06
Attending: PHYSICIAN ASSISTANT
Payer: MEDICARE

## 2022-12-06 DIAGNOSIS — E11.9 TYPE 2 DIABETES MELLITUS WITHOUT COMPLICATION: ICD-10-CM

## 2022-12-06 DIAGNOSIS — N18.30 TYPE 2 DIABETES MELLITUS WITH STAGE 3 CHRONIC KIDNEY DISEASE, WITHOUT LONG-TERM CURRENT USE OF INSULIN, UNSPECIFIED WHETHER STAGE 3A OR 3B CKD: ICD-10-CM

## 2022-12-06 DIAGNOSIS — E11.22 TYPE 2 DIABETES MELLITUS WITH STAGE 3 CHRONIC KIDNEY DISEASE, WITHOUT LONG-TERM CURRENT USE OF INSULIN, UNSPECIFIED WHETHER STAGE 3A OR 3B CKD: ICD-10-CM

## 2022-12-06 LAB
ALBUMIN SERPL BCP-MCNC: 3.7 G/DL (ref 3.5–5.2)
ALP SERPL-CCNC: 92 U/L (ref 55–135)
ALT SERPL W/O P-5'-P-CCNC: 19 U/L (ref 10–44)
ANION GAP SERPL CALC-SCNC: 4 MMOL/L (ref 8–16)
AST SERPL-CCNC: 18 U/L (ref 10–40)
BILIRUB SERPL-MCNC: 2.6 MG/DL (ref 0.1–1)
BUN SERPL-MCNC: 12 MG/DL (ref 8–23)
CALCIUM SERPL-MCNC: 8.9 MG/DL (ref 8.7–10.5)
CHLORIDE SERPL-SCNC: 109 MMOL/L (ref 95–110)
CHOLEST SERPL-MCNC: 122 MG/DL (ref 120–199)
CHOLEST/HDLC SERPL: 3.6 {RATIO} (ref 2–5)
CO2 SERPL-SCNC: 27 MMOL/L (ref 23–29)
CREAT SERPL-MCNC: 1.4 MG/DL (ref 0.5–1.4)
EST. GFR  (NO RACE VARIABLE): 55.4 ML/MIN/1.73 M^2
ESTIMATED AVG GLUCOSE: 151 MG/DL (ref 68–131)
GLUCOSE SERPL-MCNC: 238 MG/DL (ref 70–110)
HBA1C MFR BLD: 6.9 % (ref 4–5.6)
HDLC SERPL-MCNC: 34 MG/DL (ref 40–75)
HDLC SERPL: 27.9 % (ref 20–50)
LDLC SERPL CALC-MCNC: 63.2 MG/DL (ref 63–159)
NONHDLC SERPL-MCNC: 88 MG/DL
POTASSIUM SERPL-SCNC: 4.6 MMOL/L (ref 3.5–5.1)
PROT SERPL-MCNC: 7.2 G/DL (ref 6–8.4)
SODIUM SERPL-SCNC: 140 MMOL/L (ref 136–145)
TRIGL SERPL-MCNC: 124 MG/DL (ref 30–150)

## 2022-12-06 PROCEDURE — 80053 COMPREHEN METABOLIC PANEL: CPT | Performed by: PHYSICIAN ASSISTANT

## 2022-12-06 PROCEDURE — 83036 HEMOGLOBIN GLYCOSYLATED A1C: CPT | Performed by: FAMILY MEDICINE

## 2022-12-06 PROCEDURE — 92228 IMG RTA DETC/MNTR DS PHY/QHP: CPT | Mod: PBBFAC,PO

## 2022-12-06 PROCEDURE — 36415 COLL VENOUS BLD VENIPUNCTURE: CPT | Mod: PO | Performed by: FAMILY MEDICINE

## 2022-12-06 PROCEDURE — 92228 DIABETIC EYE SCREENING PHOTO: ICD-10-PCS | Mod: 26,S$GLB,, | Performed by: OPHTHALMOLOGY

## 2022-12-06 PROCEDURE — 80061 LIPID PANEL: CPT | Performed by: FAMILY MEDICINE

## 2022-12-06 PROCEDURE — 92228 IMG RTA DETC/MNTR DS PHY/QHP: CPT | Mod: 26,S$GLB,, | Performed by: OPHTHALMOLOGY

## 2022-12-06 PROCEDURE — 92228 DIABETIC EYE SCREENING PHOTO: ICD-10-PCS | Mod: TC,S$GLB,, | Performed by: FAMILY MEDICINE

## 2022-12-06 PROCEDURE — 92228 IMG RTA DETC/MNTR DS PHY/QHP: CPT | Mod: TC,S$GLB,, | Performed by: FAMILY MEDICINE

## 2022-12-06 NOTE — PROGRESS NOTES
Royer Motley is a 67 y.o. male here for a diabetic eye screening with non-dilated fundus photos per Dr. Agarwal.    Patient cooperative?: Yes  Small pupils?: Yes  Last eye exam: 2020    For exam results, see Encounter Report.

## 2022-12-07 DIAGNOSIS — E11.22 TYPE 2 DIABETES MELLITUS WITH STAGE 3 CHRONIC KIDNEY DISEASE, WITHOUT LONG-TERM CURRENT USE OF INSULIN, UNSPECIFIED WHETHER STAGE 3A OR 3B CKD: Primary | ICD-10-CM

## 2022-12-07 DIAGNOSIS — N18.30 TYPE 2 DIABETES MELLITUS WITH STAGE 3 CHRONIC KIDNEY DISEASE, WITHOUT LONG-TERM CURRENT USE OF INSULIN, UNSPECIFIED WHETHER STAGE 3A OR 3B CKD: Primary | ICD-10-CM

## 2023-02-27 ENCOUNTER — OFFICE VISIT (OUTPATIENT)
Dept: FAMILY MEDICINE | Facility: CLINIC | Age: 68
End: 2023-02-27
Payer: MEDICARE

## 2023-02-27 VITALS
RESPIRATION RATE: 17 BRPM | WEIGHT: 195.75 LBS | HEIGHT: 70 IN | HEART RATE: 71 BPM | DIASTOLIC BLOOD PRESSURE: 70 MMHG | BODY MASS INDEX: 28.02 KG/M2 | OXYGEN SATURATION: 98 % | TEMPERATURE: 98 F | SYSTOLIC BLOOD PRESSURE: 140 MMHG

## 2023-02-27 DIAGNOSIS — E11.22 TYPE 2 DIABETES MELLITUS WITH STAGE 3 CHRONIC KIDNEY DISEASE, WITHOUT LONG-TERM CURRENT USE OF INSULIN, UNSPECIFIED WHETHER STAGE 3A OR 3B CKD: Primary | ICD-10-CM

## 2023-02-27 DIAGNOSIS — N18.30 TYPE 2 DIABETES MELLITUS WITH STAGE 3 CHRONIC KIDNEY DISEASE, WITHOUT LONG-TERM CURRENT USE OF INSULIN, UNSPECIFIED WHETHER STAGE 3A OR 3B CKD: Primary | ICD-10-CM

## 2023-02-27 PROCEDURE — 3008F PR BODY MASS INDEX (BMI) DOCUMENTED: ICD-10-PCS | Mod: CPTII,S$GLB,, | Performed by: FAMILY MEDICINE

## 2023-02-27 PROCEDURE — 3077F PR MOST RECENT SYSTOLIC BLOOD PRESSURE >= 140 MM HG: ICD-10-PCS | Mod: CPTII,S$GLB,, | Performed by: FAMILY MEDICINE

## 2023-02-27 PROCEDURE — 3077F SYST BP >= 140 MM HG: CPT | Mod: CPTII,S$GLB,, | Performed by: FAMILY MEDICINE

## 2023-02-27 PROCEDURE — 1125F AMNT PAIN NOTED PAIN PRSNT: CPT | Mod: CPTII,S$GLB,, | Performed by: FAMILY MEDICINE

## 2023-02-27 PROCEDURE — 3288F FALL RISK ASSESSMENT DOCD: CPT | Mod: CPTII,S$GLB,, | Performed by: FAMILY MEDICINE

## 2023-02-27 PROCEDURE — 99999 PR PBB SHADOW E&M-EST. PATIENT-LVL IV: CPT | Mod: PBBFAC,,, | Performed by: FAMILY MEDICINE

## 2023-02-27 PROCEDURE — 1159F PR MEDICATION LIST DOCUMENTED IN MEDICAL RECORD: ICD-10-PCS | Mod: CPTII,S$GLB,, | Performed by: FAMILY MEDICINE

## 2023-02-27 PROCEDURE — 1125F PR PAIN SEVERITY QUANTIFIED, PAIN PRESENT: ICD-10-PCS | Mod: CPTII,S$GLB,, | Performed by: FAMILY MEDICINE

## 2023-02-27 PROCEDURE — 1101F PR PT FALLS ASSESS DOC 0-1 FALLS W/OUT INJ PAST YR: ICD-10-PCS | Mod: CPTII,S$GLB,, | Performed by: FAMILY MEDICINE

## 2023-02-27 PROCEDURE — 99213 OFFICE O/P EST LOW 20 MIN: CPT | Mod: S$GLB,,, | Performed by: FAMILY MEDICINE

## 2023-02-27 PROCEDURE — 99999 PR PBB SHADOW E&M-EST. PATIENT-LVL IV: ICD-10-PCS | Mod: PBBFAC,,, | Performed by: FAMILY MEDICINE

## 2023-02-27 PROCEDURE — 99214 OFFICE O/P EST MOD 30 MIN: CPT | Mod: PBBFAC,PO | Performed by: FAMILY MEDICINE

## 2023-02-27 PROCEDURE — 3078F PR MOST RECENT DIASTOLIC BLOOD PRESSURE < 80 MM HG: ICD-10-PCS | Mod: CPTII,S$GLB,, | Performed by: FAMILY MEDICINE

## 2023-02-27 PROCEDURE — 3288F PR FALLS RISK ASSESSMENT DOCUMENTED: ICD-10-PCS | Mod: CPTII,S$GLB,, | Performed by: FAMILY MEDICINE

## 2023-02-27 PROCEDURE — 3008F BODY MASS INDEX DOCD: CPT | Mod: CPTII,S$GLB,, | Performed by: FAMILY MEDICINE

## 2023-02-27 PROCEDURE — 1159F MED LIST DOCD IN RCRD: CPT | Mod: CPTII,S$GLB,, | Performed by: FAMILY MEDICINE

## 2023-02-27 PROCEDURE — 3078F DIAST BP <80 MM HG: CPT | Mod: CPTII,S$GLB,, | Performed by: FAMILY MEDICINE

## 2023-02-27 PROCEDURE — 1101F PT FALLS ASSESS-DOCD LE1/YR: CPT | Mod: CPTII,S$GLB,, | Performed by: FAMILY MEDICINE

## 2023-02-27 PROCEDURE — 99213 PR OFFICE/OUTPT VISIT, EST, LEVL III, 20-29 MIN: ICD-10-PCS | Mod: S$GLB,,, | Performed by: FAMILY MEDICINE

## 2023-02-27 NOTE — PATIENT INSTRUCTIONS
"Marv Linton,     If you are due for any health screening(s) below please notify me so we can arrange them to be ordered and scheduled to maintain your health. Most healthy patients complete it. Don't lose out on improving your health.     All of your core healthy metrics are met.                          Patient Education       Checking Your Blood Pressure at Home   The Basics   Written by the doctors and editors at [facility]   How is blood pressure measured? -- Blood pressure is usually measured with a device that goes around your upper arm. This is often done in a doctor's office. But some people also check their blood pressure themselves, at home or at work.  Blood pressure is explained with 2 numbers. For instance, your blood pressure might be "140 over 90." The first (top) number is the pressure inside your arteries when your heart is gerardo. The second (bottom) number is the pressure inside your arteries when your heart is relaxed. The table shows how doctors and nurses define high and normal blood pressure (table 1).  If your blood pressure gets too high, it puts you at risk for heart attack, stroke, and kidney disease. High blood pressure does not usually cause symptoms. But it can be serious.  What is a home blood pressure meter? -- A home blood pressure meter (or "monitor") is a device you can use to check your blood pressure yourself. It has a cuff that goes around your upper arm (figure 1). Some devices have a cuff that goes around your wrist instead. But doctors aren't sure if these work as well. The meter also has a small screen, or dial, that shows your blood pressure numbers.  There are also special meters you can wear for a day or 2. These are different because they automatically check your blood pressure throughout the day and night, even while you are sleeping. If your doctor thinks you should use one of these devices, they will talk to you about how to wear it.  Why do I need to check my blood " pressure at home? -- If your doctor knows or suspects that you have high blood pressure, they might want you to check it at home. There are a few reasons for this. Your doctor might want to look at:  Whether your blood pressure measures the same at home as it did in the doctor's office  How well your blood pressure medicines are working  Changes in your blood pressure, for example, if it goes up and down  People who check their own blood pressure at home usually do better at keeping it low.  How do I choose a home blood pressure meter? -- When choosing a home blood pressure meter, you will probably want to think about:  Cost - Some devices cost more than others. You should also check to see if your insurance will help pay for your device.  Size - It's important to make sure the cuff fits your arm comfortably. Your doctor or nurse can help you with this.  How easy it is to use - You should make sure you understand how to use the device. You also need to be able to read the numbers on the screen.  You do not need a prescription to buy a home blood pressure meter. You can buy them at most pharmacies or over the internet. Your doctor or nurse can help you choose the right device for you.  How do I check my blood pressure at home? -- Once you have a home blood pressure meter, your doctor or nurse should check it to make sure it fits you and works correctly.  When it's time to check your blood pressure:  Go to the bathroom and empty your bladder first. Having a full bladder can temporarily increase your blood pressure, making the results inaccurate.  Sit in a chair with your feet flat on the ground.  Try to breathe normally and stay calm.  Attach the cuff to your arm. Place the cuff directly on your skin, not over your clothing. The cuff should be tight enough to not slip down, but not uncomfortably tight.  Sit and relax for about 3 to 5 minutes with the cuff on.  Follow the directions that came with your device to start  measuring your blood pressure. This might involve squeezing the bulb at the end of the tube to inflate the cuff (fill it with air). With some monitors, you just need to press a button to inflate the cuff. When the cuff fills with air, it feels like someone is squeezing your arm, but it should not hurt. Then you will slowly deflate the cuff (let the air out of it), or it will deflate by itself. The screen or dial will show your blood pressure numbers.  Stay seated and relax for 1 minute, then measure your blood pressure again.  How often should I check my blood pressure? -- It depends. Different people need to follow different schedules. Your doctor or nurse will tell you how often to check your blood pressure, and when. Some people need to check their blood pressure twice a day, in the morning and evening.  Your doctor or nurse will probably tell you to keep track of your blood pressure for at least a few days (table 2). Then they will look at the numbers. The reason for this is that it's normal for your blood pressure to change a bit from day to day. For example, the numbers might change depending on whether you recently had caffeine, just exercised, or feel stressed. Checking your blood pressure over several days - or longer - will give your doctor or nurse a better idea of what is average for you.  How should I keep track of my blood pressure? -- Some blood pressure meters will record your numbers for you, or send them to your computer or smartphone. If yours does not do this, you will need to write them down. Your doctor or nurse can help you figure out the best way to keep track of the numbers.  What if my blood pressure is high? -- Your doctor or nurse will tell you what to do if your blood pressure is high when you check it at home. If you get a number that is higher than normal, measure it again to see if it is still high. If it is very high (above a certain number, which your doctor or nurse will tell you  "to watch out for), you should call your doctor right away.  If your blood pressure is only a little high, your doctor or nurse might tell you to keep checking it for a few more days or weeks, and then call if it does not go back down. Then they can help you decide what to do next.  All topics are updated as new evidence becomes available and our peer review process is complete.  This topic retrieved from [employer] on: Sep 21, 2021.  Topic 007043 Version 4.0  Release: 29.4.2 - C29.263  © 2021 UpToDate, Inc. and/or its affiliates. All rights reserved.  table 1: Definition of normal and high blood pressure  Level  Top number  Bottom number    High 130 or above 80 or above   Elevated 120 to 129 79 or below   Normal 119 or below 79 or below   These definitions are from the American College of Cardiology/American Heart Association. Other expert groups might use slightly different definitions.  "Elevated blood pressure" is a term doctor or nurses use as a warning. It means you do not yet have high blood pressure, but your blood pressure is not as low as it should be for good health.  Graphic 37178 Version 6.0  figure 1: Using a home blood pressure meter     This is an example of a person using a home blood pressure meter.  Graphic 055725 Version 1.0    table 2: 7-day diary for checking blood pressure at home  Day 1  Day 2  Day 3  Day 4  Day 5  Day 6  Day 7    Morning  1st read Morning  1st read Morning  1st read Morning  1st read Morning  1st read Morning  1st read Morning  1st read   Systolic: __________ Systolic: __________ Systolic: __________ Systolic: __________ Systolic: __________ Systolic: __________ Systolic: __________   Diastolic: __________ Diastolic: __________ Diastolic: __________ Diastolic: __________ Diastolic: __________ Diastolic: __________ Diastolic: __________   Pulse: __________ Pulse: __________ Pulse: __________ Pulse: __________ Pulse: __________ Pulse: __________ Pulse: __________   Morning  2nd " read Morning  2nd read Morning  2nd read Morning  2nd read Morning  2nd read Morning  2nd read Morning  2nd read   Systolic: __________ Systolic: __________ Systolic: __________ Systolic: __________ Systolic: __________ Systolic: __________ Systolic: __________   Diastolic: __________ Diastolic: __________ Diastolic: __________ Diastolic: __________ Diastolic: __________ Diastolic: __________ Diastolic: __________   Pulse: __________ Pulse: __________ Pulse: __________ Pulse: __________ Pulse: __________ Pulse: __________ Pulse: __________   Evening  1st read Evening  1st read Evening  1st read Evening  1st read Evening  1st read Evening  1st read Evening  1st read   Systolic: __________ Systolic: __________ Systolic: __________ Systolic: __________ Systolic: __________ Systolic: __________ Systolic: __________   Diastolic: __________ Diastolic: __________ Diastolic: __________ Diastolic: __________ Diastolic: __________ Diastolic: __________ Diastolic: __________   Pulse: __________ Pulse: __________ Pulse: __________ Pulse: __________ Pulse: __________ Pulse: __________ Pulse: __________   Evening  2nd read Evening  2nd read Evening  2nd read Evening  2nd read Evening  2nd read Evening  2nd read Evening  2nd read   Systolic: __________ Systolic: __________ Systolic: __________ Systolic: __________ Systolic: __________ Systolic: __________ Systolic: __________   Diastolic: __________ Diastolic: __________ Diastolic: __________ Diastolic: __________ Diastolic: __________ Diastolic: __________ Diastolic: __________   Pulse: __________ Pulse: __________ Pulse: __________ Pulse: __________ Pulse: __________ Pulse: __________ Pulse: __________   Notes    Notes    Notes    Notes    Notes    Notes    Notes      ____________________ ____________________ ____________________ ____________________ ____________________ ____________________ ____________________   ____________________ ____________________ ____________________  ____________________ ____________________ ____________________ ____________________   ____________________ ____________________ ____________________ ____________________ ____________________ ____________________ ____________________   Patient name: ______________________________     Patient ID: ________________________________    Primary care provider: _______________________    Average BP: _______________________________    [facility] 387797 Version 1.0  Consumer Information Use and Disclaimer   This information is not specific medical advice and does not replace information you receive from your health care provider. This is only a brief summary of general information. It does NOT include all information about conditions, illnesses, injuries, tests, procedures, treatments, therapies, discharge instructions or life-style choices that may apply to you. You must talk with your health care provider for complete information about your health and treatment options. This information should not be used to decide whether or not to accept your health care provider's advice, instructions or recommendations. Only your health care provider has the knowledge and training to provide advice that is right for you. The use of this information is governed by the [employer] End User License Agreement, available at https://www.[employer].com/en/solutions/lexicomp/about/kareem.The use of [employer] content is governed by the [employer] Terms of Use. ©2021 [employer]. All rights reserved.  Copyright   © 2021 UpToDate, Inc. and/or its affiliates. All rights reserved.

## 2023-02-28 NOTE — PROGRESS NOTES
Subjective:   Patient ID: Royer Motley is a 67 y.o. male     Chief Complaint:Follow-up (3 month)      Here for checkup    Follow-up  Pertinent negatives include no abdominal pain or chest pain.   Review of Systems   Respiratory:  Negative for shortness of breath.    Cardiovascular:  Negative for chest pain.   Gastrointestinal:  Negative for abdominal pain.   Genitourinary:  Negative for dysuria.   Past Medical History:   Diagnosis Date    Avascular necrosis of hip     Diabetes mellitus, type 2     Enlarged prostate     Sickle cell hemoglobin C disease      Past Surgical History:   Procedure Laterality Date    ANKLE DEBRIDEMENT Right     CHOLECYSTECTOMY      MANDIBLE FRACTURE SURGERY       Objective:     Vitals:    02/27/23 1106   BP: (!) 140/70   Pulse: 71   Resp: 17   Temp: 97.9 °F (36.6 °C)     Body mass index is 28.09 kg/m².  Physical Exam  Vitals and nursing note reviewed.   Constitutional:       Appearance: He is well-developed.   HENT:      Head: Normocephalic and atraumatic.   Eyes:      General: No scleral icterus.     Conjunctiva/sclera: Conjunctivae normal.   Cardiovascular:      Heart sounds: No murmur heard.  Pulmonary:      Effort: Pulmonary effort is normal. No respiratory distress.   Musculoskeletal:         General: No deformity. Normal range of motion.      Cervical back: Normal range of motion and neck supple.   Skin:     Coloration: Skin is not pale.      Findings: No rash.   Neurological:      Mental Status: He is alert and oriented to person, place, and time.   Psychiatric:         Behavior: Behavior normal.         Thought Content: Thought content normal.         Judgment: Judgment normal.     Assessment:     1. Type 2 diabetes mellitus with stage 3 chronic kidney disease, without long-term current use of insulin, unspecified whether stage 3a or 3b CKD      Plan:   Type 2 diabetes mellitus with stage 3 chronic kidney disease, without long-term current use of insulin, unspecified whether  stage 3a or 3b CKD  -     Comprehensive Metabolic Panel; Future; Expected date: 05/27/2023  -     Hemoglobin A1C; Future; Expected date: 05/27/2023            Total time spent of Less than 30 minutes minutes on the day of the visit.This includes face to face time and preparing to see the patient, obtaining and reviewing separately obtained history, documenting clinical information in the electronic or other health record, independently interpreting results and communicating results to the patient/family/caregiver, or care coordinator.    Established patient with me has been instructed that must see me at least 1 time yearly (every 365 days) for refills of medications. Seeing other providers in this clinic is fine but expectation is to see me yearly.    Nikko Agarwal MD  02/28/2023    Portions of this note have been dictated with MARÍA ELENA Patterson

## 2023-05-01 DIAGNOSIS — E11.42 TYPE 2 DIABETES MELLITUS WITH DIABETIC POLYNEUROPATHY: ICD-10-CM

## 2023-05-01 RX ORDER — METFORMIN HYDROCHLORIDE 500 MG/1
TABLET, EXTENDED RELEASE ORAL
Qty: 180 TABLET | Refills: 0 | Status: SHIPPED | OUTPATIENT
Start: 2023-05-01 | End: 2023-07-31

## 2023-05-01 RX ORDER — GLIMEPIRIDE 1 MG/1
TABLET ORAL
Qty: 90 TABLET | Refills: 0 | Status: SHIPPED | OUTPATIENT
Start: 2023-05-01 | End: 2023-07-31

## 2023-05-01 NOTE — TELEPHONE ENCOUNTER
Care Due:                  Date            Visit Type   Department     Provider  --------------------------------------------------------------------------------                                EP -                              PRIMARY      [facility]  Last Visit: 02-      CARE ([facility])   MEDICINE       Nikko Agarwal  Next Visit: None Scheduled  None         None Found                                                            Last  Test          Frequency    Reason                     Performed    Due Date  --------------------------------------------------------------------------------    HBA1C.......  6 months...  glimepiride, metFORMIN...  12- 06-    [facility] Embedded Care Due Messages. Reference number: [id number].   5/01/2023 12:45:54 PM CDT

## 2023-05-01 NOTE — TELEPHONE ENCOUNTER
Refill Routing Note   Medication(s) are not appropriate for processing by [facility] for the following reason(s):      Required labs abnormal  Drug-disease interaction    [facility] action(s):  Defer     Medication Therapy Plan: FLOS;  Type 2 diabetes mellitus with stage 3 chronic kidney disease, without long-term current use of insulin    Pharmacist review requested: Yes     Appointments  past 12m or future 3m with PCP    Date Provider   Last Visit   2/27/2023 Nikko Agarwal MD   Next Visit   Visit date not found Nikko Agarwal MD   ED visits in past 90 days: 0        Note composed:1:08 PM 05/01/2023

## 2023-05-01 NOTE — TELEPHONE ENCOUNTER
Refill Decision Note   Royer Motley  is requesting a refill authorization.  Brief Assessment and Rationale for Refill:  Approve     Medication Therapy Plan:  FLOS;  Type 2 diabetes mellitus with stage 3 chronic kidney disease, without long-term current use of insulin    Medication Reconciliation Completed: No   Comments:     No Care Gaps recommended.     Note composed:4:13 PM 05/01/2023

## 2023-05-18 ENCOUNTER — LAB VISIT (OUTPATIENT)
Dept: PRIMARY CARE CLINIC | Facility: CLINIC | Age: 68
End: 2023-05-18
Payer: MEDICAID

## 2023-05-18 DIAGNOSIS — E11.22 TYPE 2 DIABETES MELLITUS WITH STAGE 3 CHRONIC KIDNEY DISEASE, WITHOUT LONG-TERM CURRENT USE OF INSULIN, UNSPECIFIED WHETHER STAGE 3A OR 3B CKD: ICD-10-CM

## 2023-05-18 DIAGNOSIS — N18.30 TYPE 2 DIABETES MELLITUS WITH STAGE 3 CHRONIC KIDNEY DISEASE, WITHOUT LONG-TERM CURRENT USE OF INSULIN, UNSPECIFIED WHETHER STAGE 3A OR 3B CKD: ICD-10-CM

## 2023-05-18 LAB
ALBUMIN SERPL BCP-MCNC: 3.9 G/DL (ref 3.5–5.2)
ALP SERPL-CCNC: 130 U/L (ref 55–135)
ALT SERPL W/O P-5'-P-CCNC: 21 U/L (ref 10–44)
ANION GAP SERPL CALC-SCNC: 8 MMOL/L (ref 8–16)
AST SERPL-CCNC: 18 U/L (ref 10–40)
BILIRUB SERPL-MCNC: 2.3 MG/DL (ref 0.1–1)
BUN SERPL-MCNC: 19 MG/DL (ref 8–23)
CALCIUM SERPL-MCNC: 8.8 MG/DL (ref 8.7–10.5)
CHLORIDE SERPL-SCNC: 106 MMOL/L (ref 95–110)
CO2 SERPL-SCNC: 22 MMOL/L (ref 23–29)
CREAT SERPL-MCNC: 1.6 MG/DL (ref 0.5–1.4)
EST. GFR  (NO RACE VARIABLE): 47.2 ML/MIN/1.73 M^2
ESTIMATED AVG GLUCOSE: 180 MG/DL (ref 68–131)
GLUCOSE SERPL-MCNC: 371 MG/DL (ref 70–110)
HBA1C MFR BLD: 7.9 % (ref 4–5.6)
POTASSIUM SERPL-SCNC: 4.7 MMOL/L (ref 3.5–5.1)
PROT SERPL-MCNC: 7.5 G/DL (ref 6–8.4)
SODIUM SERPL-SCNC: 136 MMOL/L (ref 136–145)

## 2023-05-18 PROCEDURE — 36415 COLL VENOUS BLD VENIPUNCTURE: CPT | Mod: S$GLB,,, | Performed by: FAMILY MEDICINE

## 2023-05-18 PROCEDURE — 80053 COMPREHEN METABOLIC PANEL: CPT | Performed by: FAMILY MEDICINE

## 2023-05-18 PROCEDURE — 83036 HEMOGLOBIN GLYCOSYLATED A1C: CPT | Performed by: FAMILY MEDICINE

## 2023-05-18 PROCEDURE — 36415 PR COLLECTION VENOUS BLOOD,VENIPUNCTURE: ICD-10-PCS | Mod: S$GLB,,, | Performed by: FAMILY MEDICINE

## 2023-05-19 ENCOUNTER — TELEPHONE (OUTPATIENT)
Dept: FAMILY MEDICINE | Facility: CLINIC | Age: 68
End: 2023-05-19
Payer: MEDICARE

## 2023-05-19 NOTE — TELEPHONE ENCOUNTER
----- Message from Destiny Clarke sent at 5/19/2023  3:58 PM CDT -----  Regarding: advice  Contact: Patient  Type: Needs Medical Advice  Who Called:  Patient   Symptoms (please be specific):    How long has patient had these symptoms:    Pharmacy name and phone #:    Best Call Back Number: [phone] (home) [phone] (work)    Additional Information: Patient stated that he missed a call from doctors office. Please contact patient to advise. Thanks!

## 2023-05-30 ENCOUNTER — OFFICE VISIT (OUTPATIENT)
Dept: FAMILY MEDICINE | Facility: CLINIC | Age: 68
End: 2023-05-30
Payer: MEDICARE

## 2023-05-30 ENCOUNTER — LAB VISIT (OUTPATIENT)
Dept: LAB | Facility: HOSPITAL | Age: 68
End: 2023-05-30
Attending: PHYSICIAN ASSISTANT
Payer: MEDICARE

## 2023-05-30 VITALS
OXYGEN SATURATION: 98 % | HEIGHT: 70 IN | WEIGHT: 194.44 LBS | HEART RATE: 67 BPM | TEMPERATURE: 98 F | BODY MASS INDEX: 27.84 KG/M2

## 2023-05-30 DIAGNOSIS — N18.30 TYPE 2 DIABETES MELLITUS WITH STAGE 3 CHRONIC KIDNEY DISEASE, WITHOUT LONG-TERM CURRENT USE OF INSULIN, UNSPECIFIED WHETHER STAGE 3A OR 3B CKD: Primary | ICD-10-CM

## 2023-05-30 DIAGNOSIS — F11.20 OPIOID DEPENDENCE, UNCOMPLICATED: ICD-10-CM

## 2023-05-30 DIAGNOSIS — E11.22 TYPE 2 DIABETES MELLITUS WITH STAGE 3 CHRONIC KIDNEY DISEASE, WITHOUT LONG-TERM CURRENT USE OF INSULIN, UNSPECIFIED WHETHER STAGE 3A OR 3B CKD: Primary | ICD-10-CM

## 2023-05-30 DIAGNOSIS — Z12.5 SCREENING FOR PROSTATE CANCER: ICD-10-CM

## 2023-05-30 DIAGNOSIS — E11.22 TYPE 2 DIABETES MELLITUS WITH STAGE 3 CHRONIC KIDNEY DISEASE, WITHOUT LONG-TERM CURRENT USE OF INSULIN, UNSPECIFIED WHETHER STAGE 3A OR 3B CKD: ICD-10-CM

## 2023-05-30 DIAGNOSIS — D57.1 HB-SS DISEASE WITHOUT CRISIS: ICD-10-CM

## 2023-05-30 DIAGNOSIS — N18.30 TYPE 2 DIABETES MELLITUS WITH STAGE 3 CHRONIC KIDNEY DISEASE, WITHOUT LONG-TERM CURRENT USE OF INSULIN, UNSPECIFIED WHETHER STAGE 3A OR 3B CKD: ICD-10-CM

## 2023-05-30 LAB
ANION GAP SERPL CALC-SCNC: 8 MMOL/L (ref 8–16)
BUN SERPL-MCNC: 15 MG/DL (ref 8–23)
CALCIUM SERPL-MCNC: 8.9 MG/DL (ref 8.7–10.5)
CHLORIDE SERPL-SCNC: 109 MMOL/L (ref 95–110)
CO2 SERPL-SCNC: 20 MMOL/L (ref 23–29)
COMPLEXED PSA SERPL-MCNC: 3.7 NG/ML (ref 0–4)
CREAT SERPL-MCNC: 1.3 MG/DL (ref 0.5–1.4)
EST. GFR  (NO RACE VARIABLE): >60 ML/MIN/1.73 M^2
GLUCOSE SERPL-MCNC: 211 MG/DL (ref 70–110)
POTASSIUM SERPL-SCNC: 5.7 MMOL/L (ref 3.5–5.1)
SODIUM SERPL-SCNC: 137 MMOL/L (ref 136–145)

## 2023-05-30 PROCEDURE — 1126F PR PAIN SEVERITY QUANTIFIED, NO PAIN PRESENT: ICD-10-PCS | Mod: CPTII,S$GLB,, | Performed by: PHYSICIAN ASSISTANT

## 2023-05-30 PROCEDURE — 1126F AMNT PAIN NOTED NONE PRSNT: CPT | Mod: CPTII,S$GLB,, | Performed by: PHYSICIAN ASSISTANT

## 2023-05-30 PROCEDURE — 1159F MED LIST DOCD IN RCRD: CPT | Mod: CPTII,S$GLB,, | Performed by: PHYSICIAN ASSISTANT

## 2023-05-30 PROCEDURE — 1101F PT FALLS ASSESS-DOCD LE1/YR: CPT | Mod: CPTII,S$GLB,, | Performed by: PHYSICIAN ASSISTANT

## 2023-05-30 PROCEDURE — 90715 TDAP VACCINE 7 YRS/> IM: CPT | Mod: PBBFAC,PO

## 2023-05-30 PROCEDURE — 99213 OFFICE O/P EST LOW 20 MIN: CPT | Mod: PBBFAC,PO | Performed by: PHYSICIAN ASSISTANT

## 2023-05-30 PROCEDURE — 99213 PR OFFICE/OUTPT VISIT, EST, LEVL III, 20-29 MIN: ICD-10-PCS | Mod: S$GLB,,, | Performed by: PHYSICIAN ASSISTANT

## 2023-05-30 PROCEDURE — 3008F BODY MASS INDEX DOCD: CPT | Mod: CPTII,S$GLB,, | Performed by: PHYSICIAN ASSISTANT

## 2023-05-30 PROCEDURE — 80048 BASIC METABOLIC PNL TOTAL CA: CPT | Performed by: PHYSICIAN ASSISTANT

## 2023-05-30 PROCEDURE — 3051F HG A1C>EQUAL 7.0%<8.0%: CPT | Mod: CPTII,S$GLB,, | Performed by: PHYSICIAN ASSISTANT

## 2023-05-30 PROCEDURE — 99999 PR PBB SHADOW E&M-EST. PATIENT-LVL III: ICD-10-PCS | Mod: PBBFAC,,, | Performed by: PHYSICIAN ASSISTANT

## 2023-05-30 PROCEDURE — 3288F PR FALLS RISK ASSESSMENT DOCUMENTED: ICD-10-PCS | Mod: CPTII,S$GLB,, | Performed by: PHYSICIAN ASSISTANT

## 2023-05-30 PROCEDURE — 1101F PR PT FALLS ASSESS DOC 0-1 FALLS W/OUT INJ PAST YR: ICD-10-PCS | Mod: CPTII,S$GLB,, | Performed by: PHYSICIAN ASSISTANT

## 2023-05-30 PROCEDURE — 1159F PR MEDICATION LIST DOCUMENTED IN MEDICAL RECORD: ICD-10-PCS | Mod: CPTII,S$GLB,, | Performed by: PHYSICIAN ASSISTANT

## 2023-05-30 PROCEDURE — 84153 ASSAY OF PSA TOTAL: CPT | Performed by: PHYSICIAN ASSISTANT

## 2023-05-30 PROCEDURE — 3008F PR BODY MASS INDEX (BMI) DOCUMENTED: ICD-10-PCS | Mod: CPTII,S$GLB,, | Performed by: PHYSICIAN ASSISTANT

## 2023-05-30 PROCEDURE — 99999 PR PBB SHADOW E&M-EST. PATIENT-LVL III: CPT | Mod: PBBFAC,,, | Performed by: PHYSICIAN ASSISTANT

## 2023-05-30 PROCEDURE — 3288F FALL RISK ASSESSMENT DOCD: CPT | Mod: CPTII,S$GLB,, | Performed by: PHYSICIAN ASSISTANT

## 2023-05-30 PROCEDURE — 36415 COLL VENOUS BLD VENIPUNCTURE: CPT | Mod: PO | Performed by: PHYSICIAN ASSISTANT

## 2023-05-30 PROCEDURE — 3051F PR MOST RECENT HEMOGLOBIN A1C LEVEL 7.0 - < 8.0%: ICD-10-PCS | Mod: CPTII,S$GLB,, | Performed by: PHYSICIAN ASSISTANT

## 2023-05-30 PROCEDURE — 99213 OFFICE O/P EST LOW 20 MIN: CPT | Mod: S$GLB,,, | Performed by: PHYSICIAN ASSISTANT

## 2023-05-30 PROCEDURE — 90471 IMMUNIZATION ADMIN: CPT | Mod: PBBFAC,PO

## 2023-05-30 NOTE — PROGRESS NOTES
Subjective:       Patient ID: Royer Motley is a 67 y.o. male.    Chief Complaint: Follow-up    Patient with type 2 diabetes, chronic kidney disease stage 3, and chronic pain managed with pain management externally presents for routine follow-up.  Labs were done 2 weeks ago and showed A1c was increased to 7.9 and kidney function had declined with creatinine of 1.6 from baseline of 1.4.  Patient endorses that he had been drinking large quantities of soft drinks and having snow balls on a regular basis.  He is discontinued consuming these beverages and would like to have repeat labs done.  He is compliant with his medications.  Overall he is feeling well and has no complaints  Patients patient medical/surgical, social and family histories have been reviewed       Review of Systems   Constitutional:  Negative for activity change, appetite change, fatigue and unexpected weight change.   Respiratory:  Negative for cough, chest tightness and shortness of breath.    Cardiovascular:  Negative for chest pain, palpitations and leg swelling.   Gastrointestinal:  Negative for abdominal pain, anal bleeding, blood in stool, constipation, diarrhea and nausea.   Endocrine: Negative for polydipsia and polyuria.   Genitourinary:  Negative for difficulty urinating, frequency, hematuria and urgency.   Skin:  Negative for rash.   Neurological:  Negative for dizziness and headaches.   Psychiatric/Behavioral:  Negative for dysphoric mood. The patient is not nervous/anxious.      Objective:      Physical Exam  Constitutional:       General: He is not in acute distress.     Appearance: He is well-developed.   HENT:      Head: Normocephalic and atraumatic.   Cardiovascular:      Rate and Rhythm: Normal rate and regular rhythm.      Heart sounds: Normal heart sounds.   Pulmonary:      Effort: Pulmonary effort is normal.      Breath sounds: Normal breath sounds.   Musculoskeletal:      Cervical back: Neck supple. No tenderness.      Right  "lower leg: No edema.      Left lower leg: No edema.   Skin:     General: Skin is warm and dry.      Capillary Refill: Capillary refill takes less than 2 seconds.   Neurological:      General: No focal deficit present.      Mental Status: He is alert.   Psychiatric:         Mood and Affect: Mood normal.         Behavior: Behavior normal. Behavior is cooperative.         Judgment: Judgment normal.       Assessment:       1. Type 2 diabetes mellitus with stage 3 chronic kidney disease, without long-term current use of insulin, unspecified whether stage 3a or 3b CKD    2. Opioid dependence, uncomplicated    3. Hb-SS disease without crisis    4. Screening for prostate cancer        Plan:       Royer was seen today for follow-up.    Diagnoses and all orders for this visit:    Type 2 diabetes mellitus with stage 3 chronic kidney disease, without long-term current use of insulin, unspecified whether stage 3a or 3b CKD  -     BASIC METABOLIC PANEL; Future    Opioid dependence, uncomplicated  Stable continue per pain management  Hb-SS disease without crisis  Stable  Screening for prostate cancer  -     PSA, SCREENING; Future    Other orders  -     (In Office Administered) Tdap Vaccine  -     Cancel: (In Office Administered) Zoster Recombinant Vaccine     Will repeat BMP today to check creatinine and nonfasting blood sugar.  Would consider starting ACE inhibitor for renal protection.      Follow up for lab today ,  pcp in 3 mo .           Documentation entered by me for this encounter may have been done in part using speech-recognition technology. Although I have made an effort to ensure accuracy, "sound like" errors may exist and should be interpreted in context.  "

## 2023-05-30 NOTE — PROGRESS NOTES
Patient verified by name and . Patient received Tdap vaccine in left Deltoid. Patient tolerated injection well. Patient advised to wait in clinic for 15 minutes in case of adverse reactions. Patient demonstrated understanding.

## 2023-05-31 DIAGNOSIS — E87.5 HYPERKALEMIA: Primary | ICD-10-CM

## 2023-06-07 ENCOUNTER — LAB VISIT (OUTPATIENT)
Dept: LAB | Facility: HOSPITAL | Age: 68
End: 2023-06-07
Attending: PHYSICIAN ASSISTANT
Payer: MEDICARE

## 2023-06-07 DIAGNOSIS — E87.5 HYPERKALEMIA: ICD-10-CM

## 2023-06-07 LAB
ANION GAP SERPL CALC-SCNC: 5 MMOL/L (ref 8–16)
BUN SERPL-MCNC: 20 MG/DL (ref 8–23)
CALCIUM SERPL-MCNC: 8.9 MG/DL (ref 8.7–10.5)
CHLORIDE SERPL-SCNC: 111 MMOL/L (ref 95–110)
CO2 SERPL-SCNC: 24 MMOL/L (ref 23–29)
CREAT SERPL-MCNC: 1.3 MG/DL (ref 0.5–1.4)
EST. GFR  (NO RACE VARIABLE): >60 ML/MIN/1.73 M^2
GLUCOSE SERPL-MCNC: 157 MG/DL (ref 70–110)
POTASSIUM SERPL-SCNC: 5 MMOL/L (ref 3.5–5.1)
SODIUM SERPL-SCNC: 140 MMOL/L (ref 136–145)

## 2023-06-07 PROCEDURE — 80048 BASIC METABOLIC PNL TOTAL CA: CPT | Performed by: PHYSICIAN ASSISTANT

## 2023-06-07 PROCEDURE — 36415 COLL VENOUS BLD VENIPUNCTURE: CPT | Mod: PO | Performed by: PHYSICIAN ASSISTANT

## 2023-06-20 ENCOUNTER — TELEPHONE (OUTPATIENT)
Dept: FAMILY MEDICINE | Facility: CLINIC | Age: 68
End: 2023-06-20
Payer: MEDICARE

## 2023-06-20 ENCOUNTER — OFFICE VISIT (OUTPATIENT)
Dept: FAMILY MEDICINE | Facility: CLINIC | Age: 68
End: 2023-06-20
Payer: MEDICARE

## 2023-06-20 VITALS
SYSTOLIC BLOOD PRESSURE: 144 MMHG | DIASTOLIC BLOOD PRESSURE: 78 MMHG | BODY MASS INDEX: 28.15 KG/M2 | OXYGEN SATURATION: 99 % | TEMPERATURE: 98 F | RESPIRATION RATE: 16 BRPM | HEART RATE: 64 BPM | WEIGHT: 196.19 LBS

## 2023-06-20 DIAGNOSIS — H91.93 BILATERAL HEARING LOSS, UNSPECIFIED HEARING LOSS TYPE: Primary | ICD-10-CM

## 2023-06-20 PROCEDURE — 3078F DIAST BP <80 MM HG: CPT | Mod: CPTII,S$GLB,, | Performed by: PHYSICIAN ASSISTANT

## 2023-06-20 PROCEDURE — 3078F PR MOST RECENT DIASTOLIC BLOOD PRESSURE < 80 MM HG: ICD-10-PCS | Mod: CPTII,S$GLB,, | Performed by: PHYSICIAN ASSISTANT

## 2023-06-20 PROCEDURE — 99213 PR OFFICE/OUTPT VISIT, EST, LEVL III, 20-29 MIN: ICD-10-PCS | Mod: S$GLB,,, | Performed by: PHYSICIAN ASSISTANT

## 2023-06-20 PROCEDURE — 3008F PR BODY MASS INDEX (BMI) DOCUMENTED: ICD-10-PCS | Mod: CPTII,S$GLB,, | Performed by: PHYSICIAN ASSISTANT

## 2023-06-20 PROCEDURE — 3077F SYST BP >= 140 MM HG: CPT | Mod: CPTII,S$GLB,, | Performed by: PHYSICIAN ASSISTANT

## 2023-06-20 PROCEDURE — 1126F AMNT PAIN NOTED NONE PRSNT: CPT | Mod: CPTII,S$GLB,, | Performed by: PHYSICIAN ASSISTANT

## 2023-06-20 PROCEDURE — 3051F PR MOST RECENT HEMOGLOBIN A1C LEVEL 7.0 - < 8.0%: ICD-10-PCS | Mod: CPTII,S$GLB,, | Performed by: PHYSICIAN ASSISTANT

## 2023-06-20 PROCEDURE — 3077F PR MOST RECENT SYSTOLIC BLOOD PRESSURE >= 140 MM HG: ICD-10-PCS | Mod: CPTII,S$GLB,, | Performed by: PHYSICIAN ASSISTANT

## 2023-06-20 PROCEDURE — 99999 PR PBB SHADOW E&M-EST. PATIENT-LVL IV: ICD-10-PCS | Mod: PBBFAC,,, | Performed by: PHYSICIAN ASSISTANT

## 2023-06-20 PROCEDURE — 99213 OFFICE O/P EST LOW 20 MIN: CPT | Mod: S$GLB,,, | Performed by: PHYSICIAN ASSISTANT

## 2023-06-20 PROCEDURE — 1126F PR PAIN SEVERITY QUANTIFIED, NO PAIN PRESENT: ICD-10-PCS | Mod: CPTII,S$GLB,, | Performed by: PHYSICIAN ASSISTANT

## 2023-06-20 PROCEDURE — 99999 PR PBB SHADOW E&M-EST. PATIENT-LVL IV: CPT | Mod: PBBFAC,,, | Performed by: PHYSICIAN ASSISTANT

## 2023-06-20 PROCEDURE — 3008F BODY MASS INDEX DOCD: CPT | Mod: CPTII,S$GLB,, | Performed by: PHYSICIAN ASSISTANT

## 2023-06-20 PROCEDURE — 1101F PT FALLS ASSESS-DOCD LE1/YR: CPT | Mod: CPTII,S$GLB,, | Performed by: PHYSICIAN ASSISTANT

## 2023-06-20 PROCEDURE — 3288F FALL RISK ASSESSMENT DOCD: CPT | Mod: CPTII,S$GLB,, | Performed by: PHYSICIAN ASSISTANT

## 2023-06-20 PROCEDURE — 1101F PR PT FALLS ASSESS DOC 0-1 FALLS W/OUT INJ PAST YR: ICD-10-PCS | Mod: CPTII,S$GLB,, | Performed by: PHYSICIAN ASSISTANT

## 2023-06-20 PROCEDURE — 3051F HG A1C>EQUAL 7.0%<8.0%: CPT | Mod: CPTII,S$GLB,, | Performed by: PHYSICIAN ASSISTANT

## 2023-06-20 PROCEDURE — 3288F PR FALLS RISK ASSESSMENT DOCUMENTED: ICD-10-PCS | Mod: CPTII,S$GLB,, | Performed by: PHYSICIAN ASSISTANT

## 2023-06-20 PROCEDURE — 1159F PR MEDICATION LIST DOCUMENTED IN MEDICAL RECORD: ICD-10-PCS | Mod: CPTII,S$GLB,, | Performed by: PHYSICIAN ASSISTANT

## 2023-06-20 PROCEDURE — 1159F MED LIST DOCD IN RCRD: CPT | Mod: CPTII,S$GLB,, | Performed by: PHYSICIAN ASSISTANT

## 2023-06-20 NOTE — PROGRESS NOTES
Subjective:       Patient ID: Royer Motley is a 67 y.o. male.    Chief Complaint:hearing loss  Pt with history of Type 2 DM and neuropathy here complaining of bilateral hearing loss, worse on the right. He denies any recent ear infections, ear pain, and ear fullness. He denies any traumatic events. He describes hearing as muffled and not clear. Pt has no other complaints.   Review of Systems   Constitutional:  Negative for activity change, appetite change, fatigue and unexpected weight change.   Respiratory:  Negative for cough, chest tightness and shortness of breath.    Cardiovascular:  Negative for chest pain, palpitations and leg swelling.   Gastrointestinal:  Negative for abdominal pain, anal bleeding, blood in stool, constipation, diarrhea and nausea.   Endocrine: Negative for polydipsia and polyuria.   Genitourinary:  Negative for difficulty urinating, frequency, hematuria and urgency.   Skin:  Negative for rash.   Neurological:  Negative for dizziness and headaches.   Psychiatric/Behavioral:  Negative for dysphoric mood. The patient is not nervous/anxious.      Objective:      Physical Exam  Constitutional:       General: He is not in acute distress.     Appearance: Normal appearance. He is well-developed.   HENT:      Head: Normocephalic and atraumatic.      Right Ear: Tympanic membrane and ear canal normal.      Left Ear: Tympanic membrane and ear canal normal.      Mouth/Throat:      Mouth: Mucous membranes are moist.      Pharynx: Oropharynx is clear.   Cardiovascular:      Rate and Rhythm: Normal rate and regular rhythm.      Pulses: Normal pulses.      Heart sounds: Normal heart sounds.   Pulmonary:      Effort: Pulmonary effort is normal.      Breath sounds: Normal breath sounds.   Skin:     General: Skin is warm and dry.   Neurological:      General: No focal deficit present.      Mental Status: He is alert.   Psychiatric:         Mood and Affect: Mood normal.         Behavior: Behavior normal.  "Behavior is cooperative.       Assessment:       1. Bilateral hearing loss, unspecified hearing loss type        Plan:       Diagnoses and all orders for this visit:    Bilateral hearing loss, unspecified hearing loss type  -     Ambulatory referral/consult to ENT; Future  -     Ambulatory referral/consult to Audiology; Future           Follow up for audiology and ent -[place] .           Documentation entered by me for this encounter may have been done in part using speech-recognition technology. Although I have made an effort to ensure accuracy, "sound like" errors may exist and should be interpreted in context.     "

## 2023-06-20 NOTE — TELEPHONE ENCOUNTER
----- Message from Adán Padilla sent at 6/20/2023 10:35 AM CDT -----  Type: Needs Medical Advice  Who Called:  Pt  Best Call Back Number: [phone]  Additional Information: pt is running 10 mins late, pl call bk to advise thanks

## 2023-06-22 ENCOUNTER — TELEPHONE (OUTPATIENT)
Dept: OTOLARYNGOLOGY | Facility: CLINIC | Age: 68
End: 2023-06-22
Payer: MEDICARE

## 2023-06-22 NOTE — TELEPHONE ENCOUNTER
Lvm for pt to call back for appt. Advised anyone who answers the phone can help schedule this appt.

## 2023-06-22 NOTE — TELEPHONE ENCOUNTER
----- Message from Kelli Vasques sent at 6/22/2023  1:32 PM CDT -----  Type:  Sooner Appointment Request    Caller is requesting a sooner appointment.  Caller declined first available appointment listed below.  Caller will not accept being placed on the waitlist and is requesting a message be sent to doctor.    Name of Caller:  pt  When is the first available appointment?  N/A  Symptoms:  Referral is in the system/Hearing loss  Best Call Back Number:  [phone]  Additional Information:  Please call back to advise. Thanks!

## 2023-07-06 ENCOUNTER — TELEPHONE (OUTPATIENT)
Dept: OTOLARYNGOLOGY | Facility: CLINIC | Age: 68
End: 2023-07-06
Payer: MEDICARE

## 2023-07-06 NOTE — TELEPHONE ENCOUNTER
----- Message from Rita Ramos sent at 7/6/2023  8:26 AM CDT -----  Contact: pt [phone]  Type: Needs Medical Advice  Who Called:  Pts care taker Shaladimitrispatrick Stallings Call Back Number: [phone]    Additional Information: Kobi is calling to schedule audio/ office visit for pt. Unable to schedule this.  Epic keeps timing out while trying to schedule appt w/ audio. Pls call back and advise.

## 2023-07-19 ENCOUNTER — OFFICE VISIT (OUTPATIENT)
Dept: URGENT CARE | Facility: CLINIC | Age: 68
End: 2023-07-19
Payer: MEDICARE

## 2023-07-19 VITALS
BODY MASS INDEX: 28.09 KG/M2 | SYSTOLIC BLOOD PRESSURE: 128 MMHG | WEIGHT: 196.19 LBS | TEMPERATURE: 98 F | HEIGHT: 70 IN | DIASTOLIC BLOOD PRESSURE: 78 MMHG | OXYGEN SATURATION: 96 % | RESPIRATION RATE: 12 BRPM | HEART RATE: 70 BPM

## 2023-07-19 DIAGNOSIS — R10.31 RIGHT LOWER QUADRANT PAIN: Primary | ICD-10-CM

## 2023-07-19 LAB
BILIRUB UR QL STRIP: NEGATIVE
GLUCOSE UR QL STRIP: POSITIVE
KETONES UR QL STRIP: NEGATIVE
LEUKOCYTE ESTERASE UR QL STRIP: NEGATIVE
PH, POC UA: 5 (ref 5–8)
POC BLOOD, URINE: NEGATIVE
POC NITRATES, URINE: NEGATIVE
PROT UR QL STRIP: NEGATIVE
SP GR UR STRIP: 1.01 (ref 1–1.03)
UROBILINOGEN UR STRIP-ACNC: NORMAL (ref 0.3–2.2)

## 2023-07-19 PROCEDURE — 81003 POCT URINALYSIS, DIPSTICK, AUTOMATED, W/O SCOPE: ICD-10-PCS | Mod: QW,S$GLB,, | Performed by: NURSE PRACTITIONER

## 2023-07-19 PROCEDURE — 99213 PR OFFICE/OUTPT VISIT, EST, LEVL III, 20-29 MIN: ICD-10-PCS | Mod: S$GLB,,, | Performed by: NURSE PRACTITIONER

## 2023-07-19 PROCEDURE — 81003 URINALYSIS AUTO W/O SCOPE: CPT | Mod: QW,S$GLB,, | Performed by: NURSE PRACTITIONER

## 2023-07-19 PROCEDURE — 99213 OFFICE O/P EST LOW 20 MIN: CPT | Mod: S$GLB,,, | Performed by: NURSE PRACTITIONER

## 2023-07-19 NOTE — PROGRESS NOTES
"Subjective:      Patient ID: Royer Motley is a 67 y.o. male.    Vitals:  height is 5' 10" (1.778 m) and weight is 89 kg (196 lb 3.4 oz). His oral temperature is 98.3 °F (36.8 °C). His blood pressure is 128/78 and his pulse is 70. His respiration is 12 and oxygen saturation is 96%.     Chief Complaint: Abdominal Pain    Pt states that he had started having pain for about 2 weeks now RLQ and he had not taken anything for the pain.   Pt denies vomiting, nausea, urinating well and having normal bowel movement    Abdominal Pain  This is a new problem. The current episode started 1 to 4 weeks ago. The onset quality is sudden. The problem occurs intermittently. The problem has been unchanged. The pain is located in the RLQ. The pain is at a severity of 6/10. The pain is moderate. The quality of the pain is aching. The abdominal pain does not radiate. Pertinent negatives include no constipation, diarrhea, fever, nausea or vomiting. Nothing aggravates the pain. The pain is relieved by Nothing. He has tried nothing for the symptoms. The treatment provided no relief.     Constitution: Negative for fever.   Gastrointestinal:  Positive for abdominal pain. Negative for nausea, vomiting, constipation and diarrhea.    Objective:     Physical Exam   Constitutional: He is oriented to person, place, and time. He appears well-developed.   HENT:   Head: Normocephalic and atraumatic.   Ears:   Right Ear: External ear normal.   Left Ear: External ear normal.   Nose: Nose normal.   Mouth/Throat: Mucous membranes are normal.   Eyes: Conjunctivae and lids are normal.   Neck: Trachea normal. Neck supple.   Cardiovascular: Normal rate, regular rhythm and normal heart sounds.   Pulmonary/Chest: Effort normal and breath sounds normal. No respiratory distress.   Abdominal: Normal appearance and bowel sounds are normal. He exhibits no distension and no mass. Soft. There is abdominal tenderness in the right lower quadrant. There is no left CVA " tenderness and no right CVA tenderness.   Musculoskeletal: Normal range of motion.         General: Normal range of motion.   Neurological: He is alert and oriented to person, place, and time. He has normal strength.   Skin: Skin is warm, dry, intact, not diaphoretic and not pale.   Psychiatric: His speech is normal and behavior is normal. Judgment and thought content normal.   Nursing note and vitals reviewed.    Assessment:     1. Right lower quadrant pain        Plan:     Results for orders placed or performed in visit on 07/19/23   POCT Urinalysis, Dipstick, Automated, W/O Scope   Result Value Ref Range    POC Blood, Urine Negative Negative    POC Bilirubin, Urine Negative Negative    POC Urobilinogen, Urine Normal 0.3 - 2.2    POC Ketones, Urine Negative Negative    POC Protein, Urine Negative Negative    POC Nitrates, Urine Negative Negative    POC Glucose, Urine Positive (A) Negative    pH, UA 5.0 5 - 8    POC Specific Gravity, Urine 1.015 1.003 - 1.029    POC Leukocytes, Urine Negative Negative     Case is discussed with Dr. Jimenez.  He advises refer to ED for further evaluation and imaging due to the limitations of an urgent care setting.  Patient's daughter is bringing patient to [place] ER.  Report is called.  Differential diagnosis include diverticulitis versus kidney stone versus appendicitis.  Due to patient's history of sickle cell anemia as well as chronic kidney disease advised the patient go to a higher level of care.        Right lower quadrant pain  -     POCT Urinalysis, Dipstick, Automated, W/O Scope  -     Refer to Emergency Dept.

## 2023-07-31 DIAGNOSIS — E11.42 TYPE 2 DIABETES MELLITUS WITH DIABETIC POLYNEUROPATHY: ICD-10-CM

## 2023-07-31 RX ORDER — GLIMEPIRIDE 1 MG/1
TABLET ORAL
Qty: 90 TABLET | Refills: 0 | Status: SHIPPED | OUTPATIENT
Start: 2023-07-31

## 2023-07-31 RX ORDER — METFORMIN HYDROCHLORIDE 500 MG/1
TABLET, EXTENDED RELEASE ORAL
Qty: 180 TABLET | Refills: 0 | Status: SHIPPED | OUTPATIENT
Start: 2023-07-31 | End: 2023-10-31

## 2023-07-31 NOTE — TELEPHONE ENCOUNTER
Refill Routing Note   Medication(s) are not appropriate for processing by [facility] for the following reason(s):      Patient seen in ED/Hospital since LOV with PCP  Required labs abnormal    [facility] action(s):  Defer None identified        Pharmacist review requested: Yes     Appointments  past 12m or future 3m with PCP    Date Provider   Last Visit   2/27/2023 Nikko Agarwal MD   Next Visit   10/4/2023 Nikko Agarwal MD   ED visits in past 90 days: 1        Note composed:4:13 PM 07/31/2023

## 2023-07-31 NOTE — TELEPHONE ENCOUNTER
Refill Decision Note   Royer Motley  is requesting a refill authorization.  Brief Assessment and Rationale for Refill:  Approve     Medication Therapy Plan:  small change in Cr; ED visit unrelated to requested rx    Medication Reconciliation Completed: No   Comments:     No Care Gaps recommended.     Note composed:6:11 PM 07/31/2023

## 2023-07-31 NOTE — TELEPHONE ENCOUNTER
No care due was identified.  [facility] Embedded Care Due Messages. Reference number: [id number].   7/31/2023 12:42:40 PM CDT

## 2023-08-08 ENCOUNTER — OFFICE VISIT (OUTPATIENT)
Dept: OTOLARYNGOLOGY | Facility: CLINIC | Age: 68
End: 2023-08-08
Payer: MEDICARE

## 2023-08-08 ENCOUNTER — CLINICAL SUPPORT (OUTPATIENT)
Dept: AUDIOLOGY | Facility: CLINIC | Age: 68
End: 2023-08-08
Payer: MEDICARE

## 2023-08-08 VITALS — WEIGHT: 257.94 LBS | BODY MASS INDEX: 36.93 KG/M2 | HEIGHT: 70 IN

## 2023-08-08 DIAGNOSIS — H61.23 BILATERAL IMPACTED CERUMEN: ICD-10-CM

## 2023-08-08 DIAGNOSIS — H91.93 BILATERAL HEARING LOSS, UNSPECIFIED HEARING LOSS TYPE: ICD-10-CM

## 2023-08-08 DIAGNOSIS — H93.293 IMPAIRED AUDITORY DISCRIMINATION, BILATERAL: Primary | ICD-10-CM

## 2023-08-08 DIAGNOSIS — H90.3 SENSORINEURAL HEARING LOSS (SNHL) OF BOTH EARS: Primary | ICD-10-CM

## 2023-08-08 DIAGNOSIS — H90.3 BILATERAL SENSORINEURAL HEARING LOSS: ICD-10-CM

## 2023-08-08 PROCEDURE — 92557 COMPREHENSIVE HEARING TEST: CPT | Mod: HCNC,S$GLB,, | Performed by: AUDIOLOGIST

## 2023-08-08 PROCEDURE — 99999 PR PBB SHADOW E&M-EST. PATIENT-LVL III: CPT | Mod: PBBFAC,HCNC,, | Performed by: NURSE PRACTITIONER

## 2023-08-08 PROCEDURE — 92557 PR COMPREHENSIVE HEARING TEST: ICD-10-PCS | Mod: HCNC,S$GLB,, | Performed by: AUDIOLOGIST

## 2023-08-08 PROCEDURE — 3051F PR MOST RECENT HEMOGLOBIN A1C LEVEL 7.0 - < 8.0%: ICD-10-PCS | Mod: HCNC,CPTII,S$GLB, | Performed by: NURSE PRACTITIONER

## 2023-08-08 PROCEDURE — 1126F AMNT PAIN NOTED NONE PRSNT: CPT | Mod: HCNC,CPTII,S$GLB, | Performed by: NURSE PRACTITIONER

## 2023-08-08 PROCEDURE — 1159F MED LIST DOCD IN RCRD: CPT | Mod: HCNC,CPTII,S$GLB, | Performed by: NURSE PRACTITIONER

## 2023-08-08 PROCEDURE — G0268 REMOVAL OF IMPACTED WAX MD: HCPCS | Mod: HCNC,S$GLB,, | Performed by: NURSE PRACTITIONER

## 2023-08-08 PROCEDURE — 1159F PR MEDICATION LIST DOCUMENTED IN MEDICAL RECORD: ICD-10-PCS | Mod: HCNC,CPTII,S$GLB, | Performed by: NURSE PRACTITIONER

## 2023-08-08 PROCEDURE — G0268 PR REMOVAL OF IMPACTED WAX MD: ICD-10-PCS | Mod: HCNC,S$GLB,, | Performed by: NURSE PRACTITIONER

## 2023-08-08 PROCEDURE — 99203 OFFICE O/P NEW LOW 30 MIN: CPT | Mod: 25,HCNC,S$GLB, | Performed by: NURSE PRACTITIONER

## 2023-08-08 PROCEDURE — 99999 PR PBB SHADOW E&M-EST. PATIENT-LVL III: ICD-10-PCS | Mod: PBBFAC,HCNC,, | Performed by: NURSE PRACTITIONER

## 2023-08-08 PROCEDURE — 3051F HG A1C>EQUAL 7.0%<8.0%: CPT | Mod: HCNC,CPTII,S$GLB, | Performed by: NURSE PRACTITIONER

## 2023-08-08 PROCEDURE — 1160F PR REVIEW ALL MEDS BY PRESCRIBER/CLIN PHARMACIST DOCUMENTED: ICD-10-PCS | Mod: HCNC,CPTII,S$GLB, | Performed by: NURSE PRACTITIONER

## 2023-08-08 PROCEDURE — 92567 PR TYMPA2METRY: ICD-10-PCS | Mod: HCNC,S$GLB,, | Performed by: AUDIOLOGIST

## 2023-08-08 PROCEDURE — 1126F PR PAIN SEVERITY QUANTIFIED, NO PAIN PRESENT: ICD-10-PCS | Mod: HCNC,CPTII,S$GLB, | Performed by: NURSE PRACTITIONER

## 2023-08-08 PROCEDURE — 92567 TYMPANOMETRY: CPT | Mod: HCNC,S$GLB,, | Performed by: AUDIOLOGIST

## 2023-08-08 PROCEDURE — 1160F RVW MEDS BY RX/DR IN RCRD: CPT | Mod: HCNC,CPTII,S$GLB, | Performed by: NURSE PRACTITIONER

## 2023-08-08 PROCEDURE — 3288F FALL RISK ASSESSMENT DOCD: CPT | Mod: HCNC,CPTII,S$GLB, | Performed by: NURSE PRACTITIONER

## 2023-08-08 PROCEDURE — 99999 PR PBB SHADOW E&M-EST. PATIENT-LVL II: ICD-10-PCS | Mod: PBBFAC,HCNC,, | Performed by: AUDIOLOGIST

## 2023-08-08 PROCEDURE — 3288F PR FALLS RISK ASSESSMENT DOCUMENTED: ICD-10-PCS | Mod: HCNC,CPTII,S$GLB, | Performed by: NURSE PRACTITIONER

## 2023-08-08 PROCEDURE — 1101F PR PT FALLS ASSESS DOC 0-1 FALLS W/OUT INJ PAST YR: ICD-10-PCS | Mod: HCNC,CPTII,S$GLB, | Performed by: NURSE PRACTITIONER

## 2023-08-08 PROCEDURE — 99203 PR OFFICE/OUTPT VISIT, NEW, LEVL III, 30-44 MIN: ICD-10-PCS | Mod: 25,HCNC,S$GLB, | Performed by: NURSE PRACTITIONER

## 2023-08-08 PROCEDURE — 99999 PR PBB SHADOW E&M-EST. PATIENT-LVL II: CPT | Mod: PBBFAC,HCNC,, | Performed by: AUDIOLOGIST

## 2023-08-08 PROCEDURE — 3008F PR BODY MASS INDEX (BMI) DOCUMENTED: ICD-10-PCS | Mod: HCNC,CPTII,S$GLB, | Performed by: NURSE PRACTITIONER

## 2023-08-08 PROCEDURE — 3008F BODY MASS INDEX DOCD: CPT | Mod: HCNC,CPTII,S$GLB, | Performed by: NURSE PRACTITIONER

## 2023-08-08 PROCEDURE — 1101F PT FALLS ASSESS-DOCD LE1/YR: CPT | Mod: HCNC,CPTII,S$GLB, | Performed by: NURSE PRACTITIONER

## 2023-08-08 RX ORDER — FINASTERIDE 5 MG/1
5 TABLET, FILM COATED ORAL
Status: ON HOLD | COMMUNITY
Start: 2023-07-25 | End: 2023-08-18 | Stop reason: HOSPADM

## 2023-08-08 RX ORDER — ZOSTER VACCINE RECOMBINANT, ADJUVANTED 50 MCG/0.5
KIT INTRAMUSCULAR
COMMUNITY
Start: 2023-06-02

## 2023-08-08 NOTE — PROGRESS NOTES
The patient was referred by BRENDA Forman for a hearing evaluation. The patient was seen in the audiology clinic today, accompanied by a friend.    Report from the patient was as follows:    Difficulty Hearing/Understanding - Patient has noticed gradual onset of hearing difficulty. no previous hearing evaluation   Tinnitus - negative  History of Loud Noise Exposure - loud music  Family History of Hearing Loss -negative    Otoscopic screening revealed a clear view of TM AU    A hearing evaluation was performed today. Test results indicated a mild to moderately severe sensorineural hearing loss in the left ear and a mild to severe sensorineural hearing loss in the right ear. Impedance testing showed a Type A tympanogram in each ear, consistent with normal middle ear function.    The recommendations were as follows:    (1)  Medical evaluation due to slight asymmetry  (2)  Hearing aid consult pending medical clearance  (3)  Ear protection in loud noise   (4)  Hearing evaluation in one year or sooner if hearing decrease is noted       Today's test results and recommendations were discussed with the patient.

## 2023-08-08 NOTE — PROGRESS NOTES
Subjective     Patient ID: Royer Motley is a 67 y.o. male.    Chief Complaint: Hearing Loss and Cerumen Impaction    HPI  Patient is new to ENT, referred by GRETA Leavitt for consultation for hearing loss. 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 Patient denies tinnitus. Patient denies family h/o HL. Patient denies otologic h/o surgery or trauma.     Review of Systems   Constitutional: Negative.    HENT:  Positive for hearing loss.    Eyes: Negative.    Respiratory: Negative.     Cardiovascular: Negative.    Gastrointestinal: Negative.    Musculoskeletal: Negative.    Integumentary:  Negative.   Neurological: Negative.    Hematological: Negative.    Psychiatric/Behavioral: Negative.            Objective     Physical Exam  Vitals and nursing note reviewed.   Constitutional:       General: He is not in acute distress.     Appearance: He is well-developed. He is not ill-appearing.   HENT:      Head: Normocephalic and atraumatic.      Right Ear: Hearing, tympanic membrane, ear canal and external ear normal. No middle ear effusion. Tympanic membrane is not erythematous.      Left Ear: Hearing, tympanic membrane, ear canal and external ear normal.  No middle ear effusion. Tympanic membrane is not erythematous.      Nose: Nose normal.   Eyes:      General: Lids are normal. No scleral icterus.        Right eye: No discharge.         Left eye: No discharge.   Neck:      Trachea: Trachea normal. No tracheal deviation.   Cardiovascular:      Rate and Rhythm: Normal rate.   Pulmonary:      Effort: Pulmonary effort is normal. No respiratory distress.      Breath sounds: No stridor. No wheezing.   Musculoskeletal:         General: Normal range of motion.      Cervical back: Normal range of motion.   Skin:     General: Skin is warm and dry.   Neurological:      Mental Status: He is alert and oriented to person, place, and time.      Coordination: Coordination is intact.      Gait: Gait normal.    Psychiatric:         Attention and Perception: Attention normal.         Mood and Affect: Mood normal.         Speech: Speech normal.         Behavior: Behavior normal. Behavior is cooperative.     SEPARATE PROCEDURE IN OFFICE:   Procedure: Removal of impacted cerumen, bilateral   Pre Procedure Diagnosis: Cerumen Impaction   Post Procedure Diagnosis: Cerumen Impaction   Verbal informed consent in regards to risk of trauma to ear canal, ear drum or hearing, discomfort during procedure and/or inability to remove cerumen impaction in one session or unforeseen events or complications.   No anesthesia.     Procedure in detail:   Ear canal visualized bilateral with appropriate size ear speculum utilizing Operating Head Binocular Otomicroscope   Utilizing the following:  Ring curet, delicate alligator forceps, and/or suction cannula was used. The impacted cerumen of the ear canals was removed atraumatically. The TM and EAC were then inspected and found to be clear of wax. See description of TMs/EACs in PE above.   Complications: No   Condition: Improved/Good           Assessment and Plan     1. Impaired auditory discrimination, bilateral    2. Bilateral sensorineural hearing loss  -     Ambulatory referral/consult to ENT    3. Bilateral impacted cerumen      PATIENT IS MEDICALLY CLEARED FOR HEARING AIDS. The patient's hearing loss is not due to temporarily, correctable physical condition. There are no contraindications to hearing aid candidacy. Patient's audiogram reveals the patient is a candidate for amplification. Audiogram is reviewed in detail with the patient. The audiologist's recommendation that the patient have amplification/hearing aids is discussed and questions answered. Patient has been given information by the audiologist on how to schedule a hearing aid consultation. Patient is encouraged to wear ear protection in loud noise and return annually for hearing test. Return to clinic as needed for further ENT  concerns.         No follow-ups on file.

## 2023-08-11 NOTE — TELEPHONE ENCOUNTER
No care due was identified.  [facility] Embedded Care Due Messages. Reference number: [id number].   8/11/2023 2:11:12 PM CDT

## 2023-08-11 NOTE — TELEPHONE ENCOUNTER
Refill Routing Note   Medication(s) are not appropriate for processing by [facility] for the following reason(s):      No active prescription written by provider    [facility] action(s):  Defer Care Due:  None identified            Appointments  past 12m or future 3m with PCP    Date Provider   Last Visit   2/27/2023 Nikko Agarwal MD   Next Visit   10/4/2023 Nikko Agarwal MD   ED visits in past 90 days: 1        Note composed:3:49 PM 08/11/2023

## 2023-08-14 RX ORDER — TAMSULOSIN HYDROCHLORIDE 0.4 MG/1
2 CAPSULE ORAL
Qty: 180 CAPSULE | Refills: 1 | Status: ON HOLD | OUTPATIENT
Start: 2023-08-14 | End: 2023-08-18 | Stop reason: HOSPADM

## 2023-08-17 PROBLEM — N40.1 BENIGN PROSTATIC HYPERPLASIA WITH INCOMPLETE BLADDER EMPTYING: Status: ACTIVE | Noted: 2023-08-17

## 2023-08-17 PROBLEM — R39.14 BENIGN PROSTATIC HYPERPLASIA WITH INCOMPLETE BLADDER EMPTYING: Status: ACTIVE | Noted: 2023-08-17

## 2023-10-04 ENCOUNTER — OFFICE VISIT (OUTPATIENT)
Dept: FAMILY MEDICINE | Facility: CLINIC | Age: 68
End: 2023-10-04
Payer: MEDICARE

## 2023-10-04 VITALS
TEMPERATURE: 98 F | DIASTOLIC BLOOD PRESSURE: 80 MMHG | WEIGHT: 174.63 LBS | HEIGHT: 70 IN | OXYGEN SATURATION: 98 % | BODY MASS INDEX: 25 KG/M2 | HEART RATE: 74 BPM | SYSTOLIC BLOOD PRESSURE: 128 MMHG

## 2023-10-04 DIAGNOSIS — E11.22 TYPE 2 DIABETES MELLITUS WITH STAGE 3 CHRONIC KIDNEY DISEASE, WITHOUT LONG-TERM CURRENT USE OF INSULIN, UNSPECIFIED WHETHER STAGE 3A OR 3B CKD: Primary | ICD-10-CM

## 2023-10-04 DIAGNOSIS — R63.4 WEIGHT LOSS: ICD-10-CM

## 2023-10-04 DIAGNOSIS — N18.30 TYPE 2 DIABETES MELLITUS WITH STAGE 3 CHRONIC KIDNEY DISEASE, WITHOUT LONG-TERM CURRENT USE OF INSULIN, UNSPECIFIED WHETHER STAGE 3A OR 3B CKD: Primary | ICD-10-CM

## 2023-10-04 DIAGNOSIS — R05.9 COUGH, UNSPECIFIED TYPE: ICD-10-CM

## 2023-10-04 PROCEDURE — 3288F FALL RISK ASSESSMENT DOCD: CPT | Mod: HCNC,CPTII,S$GLB, | Performed by: FAMILY MEDICINE

## 2023-10-04 PROCEDURE — 1159F PR MEDICATION LIST DOCUMENTED IN MEDICAL RECORD: ICD-10-PCS | Mod: HCNC,CPTII,S$GLB, | Performed by: FAMILY MEDICINE

## 2023-10-04 PROCEDURE — 1125F PR PAIN SEVERITY QUANTIFIED, PAIN PRESENT: ICD-10-PCS | Mod: HCNC,CPTII,S$GLB, | Performed by: FAMILY MEDICINE

## 2023-10-04 PROCEDURE — 3079F PR MOST RECENT DIASTOLIC BLOOD PRESSURE 80-89 MM HG: ICD-10-PCS | Mod: HCNC,CPTII,S$GLB, | Performed by: FAMILY MEDICINE

## 2023-10-04 PROCEDURE — 1101F PT FALLS ASSESS-DOCD LE1/YR: CPT | Mod: HCNC,CPTII,S$GLB, | Performed by: FAMILY MEDICINE

## 2023-10-04 PROCEDURE — 3008F PR BODY MASS INDEX (BMI) DOCUMENTED: ICD-10-PCS | Mod: HCNC,CPTII,S$GLB, | Performed by: FAMILY MEDICINE

## 2023-10-04 PROCEDURE — 99999 PR PBB SHADOW E&M-EST. PATIENT-LVL III: ICD-10-PCS | Mod: PBBFAC,HCNC,, | Performed by: FAMILY MEDICINE

## 2023-10-04 PROCEDURE — 3051F PR MOST RECENT HEMOGLOBIN A1C LEVEL 7.0 - < 8.0%: ICD-10-PCS | Mod: HCNC,CPTII,S$GLB, | Performed by: FAMILY MEDICINE

## 2023-10-04 PROCEDURE — 3051F HG A1C>EQUAL 7.0%<8.0%: CPT | Mod: HCNC,CPTII,S$GLB, | Performed by: FAMILY MEDICINE

## 2023-10-04 PROCEDURE — 1125F AMNT PAIN NOTED PAIN PRSNT: CPT | Mod: HCNC,CPTII,S$GLB, | Performed by: FAMILY MEDICINE

## 2023-10-04 PROCEDURE — 3074F SYST BP LT 130 MM HG: CPT | Mod: HCNC,CPTII,S$GLB, | Performed by: FAMILY MEDICINE

## 2023-10-04 PROCEDURE — 99214 PR OFFICE/OUTPT VISIT, EST, LEVL IV, 30-39 MIN: ICD-10-PCS | Mod: HCNC,S$GLB,, | Performed by: FAMILY MEDICINE

## 2023-10-04 PROCEDURE — 99214 OFFICE O/P EST MOD 30 MIN: CPT | Mod: HCNC,S$GLB,, | Performed by: FAMILY MEDICINE

## 2023-10-04 PROCEDURE — 3288F PR FALLS RISK ASSESSMENT DOCUMENTED: ICD-10-PCS | Mod: HCNC,CPTII,S$GLB, | Performed by: FAMILY MEDICINE

## 2023-10-04 PROCEDURE — 3008F BODY MASS INDEX DOCD: CPT | Mod: HCNC,CPTII,S$GLB, | Performed by: FAMILY MEDICINE

## 2023-10-04 PROCEDURE — 99999 PR PBB SHADOW E&M-EST. PATIENT-LVL III: CPT | Mod: PBBFAC,HCNC,, | Performed by: FAMILY MEDICINE

## 2023-10-04 PROCEDURE — 3074F PR MOST RECENT SYSTOLIC BLOOD PRESSURE < 130 MM HG: ICD-10-PCS | Mod: HCNC,CPTII,S$GLB, | Performed by: FAMILY MEDICINE

## 2023-10-04 PROCEDURE — 1101F PR PT FALLS ASSESS DOC 0-1 FALLS W/OUT INJ PAST YR: ICD-10-PCS | Mod: HCNC,CPTII,S$GLB, | Performed by: FAMILY MEDICINE

## 2023-10-04 PROCEDURE — 3079F DIAST BP 80-89 MM HG: CPT | Mod: HCNC,CPTII,S$GLB, | Performed by: FAMILY MEDICINE

## 2023-10-04 PROCEDURE — 1159F MED LIST DOCD IN RCRD: CPT | Mod: HCNC,CPTII,S$GLB, | Performed by: FAMILY MEDICINE

## 2023-10-04 RX ORDER — PROMETHAZINE HYDROCHLORIDE AND DEXTROMETHORPHAN HYDROBROMIDE 6.25; 15 MG/5ML; MG/5ML
5 SYRUP ORAL EVERY 6 HOURS PRN
Qty: 180 ML | Refills: 0 | Status: SHIPPED | OUTPATIENT
Start: 2023-10-04 | End: 2023-10-14

## 2023-10-04 RX ORDER — OXYCODONE HYDROCHLORIDE 30 MG/1
30 TABLET ORAL 4 TIMES DAILY PRN
COMMUNITY
Start: 2023-10-03

## 2023-10-04 NOTE — PATIENT INSTRUCTIONS
Marv Linton,     If you are due for any health screening(s) below please notify me so we can arrange them to be ordered and scheduled to maintain your health. Most healthy patients complete it. Don't lose out on improving your health.     All of your core healthy metrics are met.

## 2023-10-04 NOTE — PROGRESS NOTES
Subjective:   Patient ID: Royer Motley is a 68 y.o. male     Chief Complaint:Follow-up (3 month follow up)      Here for checkup    Follow-up  Pertinent negatives include no abdominal pain, arthralgias, chest pain, chills, coughing, fever, headaches, sore throat or weakness.     Review of Systems   Constitutional:  Negative for chills and fever.   HENT:  Negative for sore throat and trouble swallowing.    Respiratory:  Negative for cough and shortness of breath.    Cardiovascular:  Negative for chest pain and leg swelling.   Gastrointestinal:  Negative for abdominal distention and abdominal pain.   Genitourinary:  Negative for dysuria and flank pain.   Musculoskeletal:  Negative for arthralgias and back pain.   Skin:  Negative for color change and pallor.   Neurological:  Negative for weakness and headaches.   Psychiatric/Behavioral:  Negative for agitation and confusion.      Past Medical History:   Diagnosis Date    Avascular necrosis of hip     Diabetes mellitus, type 2     Enlarged prostate     Sickle cell hemoglobin C disease      Past Surgical History:   Procedure Laterality Date    ANKLE DEBRIDEMENT Right     CHOLECYSTECTOMY      MANDIBLE FRACTURE SURGERY      ROBOT-ASSISTED LAPAROSCOPIC SIMPLE PROSTATECTOMY USING DA CHANDLER XI N/A 8/17/2023    Procedure: XI ROBOTIC PROSTATECTOMY, SIMPLE;  Surgeon: Aung Pollock MD;  Location: [facility];  Service: Urology;  Laterality: N/A;     Objective:     Vitals:    10/04/23 1456   BP: 128/80   Pulse: 74   Temp: 97.5 °F (36.4 °C)     Body mass index is 25.05 kg/m².  Physical Exam  Vitals and nursing note reviewed.   Constitutional:       Appearance: He is well-developed.   HENT:      Head: Normocephalic and atraumatic.   Eyes:      General: No scleral icterus.     Conjunctiva/sclera: Conjunctivae normal.   Cardiovascular:      Heart sounds: No murmur heard.  Pulmonary:      Effort: Pulmonary effort is normal. No respiratory distress.   Musculoskeletal:          General: No deformity. Normal range of motion.      Cervical back: Normal range of motion and neck supple.   Skin:     Coloration: Skin is not pale.      Findings: No rash.   Neurological:      Mental Status: He is alert and oriented to person, place, and time.   Psychiatric:         Behavior: Behavior normal.         Thought Content: Thought content normal.         Judgment: Judgment normal.       Assessment:     1. Type 2 diabetes mellitus with stage 3 chronic kidney disease, without long-term current use of insulin, unspecified whether stage 3a or 3b CKD    2. Cough, unspecified type    3. Weight loss      Plan:   Type 2 diabetes mellitus with stage 3 chronic kidney disease, without long-term current use of insulin, unspecified whether stage 3a or 3b CKD  -     Comprehensive Metabolic Panel; Future; Expected date: 10/04/2023  -     Hemoglobin A1C; Future; Expected date: 10/04/2023  -     CBC Auto Differential; Future; Expected date: 10/04/2023    Cough, unspecified type  -     promethazine-dextromethorphan (PROMETHAZINE-DM) 6.25-15 mg/5 mL Syrp; Take 5 mLs by mouth every 6 (six) hours as needed.  Dispense: 180 mL; Refill: 0    Weight loss  -     Comprehensive Metabolic Panel; Future; Expected date: 10/04/2023  -     CBC Auto Differential; Future; Expected date: 10/04/2023            Total time spent of Greater than 30 minutes minutes on the day of the visit.This includes face to face time and preparing to see the patient, obtaining and reviewing separately obtained history, documenting clinical information in the electronic or other health record, independently interpreting results and communicating results to the patient/family/caregiver, or care coordinator.    Established patient with me has been instructed that must see me at least 1 time yearly (every 365 days) for refills of medications. Seeing other providers in this clinic is fine but expectation is to see me yearly.    Nikko Agarwal,  MD  10/04/2023    Portions of this note have been dictated with MARÍA ELENA Van.

## 2023-10-05 ENCOUNTER — TELEPHONE (OUTPATIENT)
Dept: FAMILY MEDICINE | Facility: CLINIC | Age: 68
End: 2023-10-05
Payer: MEDICARE

## 2023-10-05 ENCOUNTER — LAB VISIT (OUTPATIENT)
Dept: LAB | Facility: HOSPITAL | Age: 68
End: 2023-10-05
Attending: FAMILY MEDICINE
Payer: MEDICARE

## 2023-10-05 DIAGNOSIS — R63.4 WEIGHT LOSS: ICD-10-CM

## 2023-10-05 DIAGNOSIS — N18.30 TYPE 2 DIABETES MELLITUS WITH STAGE 3 CHRONIC KIDNEY DISEASE, WITHOUT LONG-TERM CURRENT USE OF INSULIN, UNSPECIFIED WHETHER STAGE 3A OR 3B CKD: ICD-10-CM

## 2023-10-05 DIAGNOSIS — E11.22 TYPE 2 DIABETES MELLITUS WITH STAGE 3 CHRONIC KIDNEY DISEASE, WITHOUT LONG-TERM CURRENT USE OF INSULIN, UNSPECIFIED WHETHER STAGE 3A OR 3B CKD: ICD-10-CM

## 2023-10-05 LAB
ALBUMIN SERPL BCP-MCNC: 3.6 G/DL (ref 3.5–5.2)
ALP SERPL-CCNC: 120 U/L (ref 55–135)
ALT SERPL W/O P-5'-P-CCNC: 13 U/L (ref 10–44)
ANION GAP SERPL CALC-SCNC: 5 MMOL/L (ref 8–16)
AST SERPL-CCNC: 15 U/L (ref 10–40)
BASOPHILS # BLD AUTO: 0.03 K/UL (ref 0–0.2)
BASOPHILS NFR BLD: 0.3 % (ref 0–1.9)
BILIRUB SERPL-MCNC: 2.1 MG/DL (ref 0.1–1)
BUN SERPL-MCNC: 17 MG/DL (ref 8–23)
CALCIUM SERPL-MCNC: 8.8 MG/DL (ref 8.7–10.5)
CHLORIDE SERPL-SCNC: 100 MMOL/L (ref 95–110)
CO2 SERPL-SCNC: 23 MMOL/L (ref 23–29)
CREAT SERPL-MCNC: 1.7 MG/DL (ref 0.5–1.4)
DIFFERENTIAL METHOD: ABNORMAL
EOSINOPHIL # BLD AUTO: 0.2 K/UL (ref 0–0.5)
EOSINOPHIL NFR BLD: 2 % (ref 0–8)
ERYTHROCYTE [DISTWIDTH] IN BLOOD BY AUTOMATED COUNT: 14.8 % (ref 11.5–14.5)
EST. GFR  (NO RACE VARIABLE): 43.6 ML/MIN/1.73 M^2
ESTIMATED AVG GLUCOSE: 237 MG/DL (ref 68–131)
GLUCOSE SERPL-MCNC: 513 MG/DL (ref 70–110)
HBA1C MFR BLD: 9.9 % (ref 4–5.6)
HCT VFR BLD AUTO: 35.3 % (ref 40–54)
HGB BLD-MCNC: 12.4 G/DL (ref 14–18)
IMM GRANULOCYTES # BLD AUTO: 0.03 K/UL (ref 0–0.04)
IMM GRANULOCYTES NFR BLD AUTO: 0.3 % (ref 0–0.5)
LYMPHOCYTES # BLD AUTO: 2.4 K/UL (ref 1–4.8)
LYMPHOCYTES NFR BLD: 24 % (ref 18–48)
MCH RBC QN AUTO: 26.8 PG (ref 27–31)
MCHC RBC AUTO-ENTMCNC: 35.1 G/DL (ref 32–36)
MCV RBC AUTO: 76 FL (ref 82–98)
MONOCYTES # BLD AUTO: 0.8 K/UL (ref 0.3–1)
MONOCYTES NFR BLD: 7.7 % (ref 4–15)
NEUTROPHILS # BLD AUTO: 6.5 K/UL (ref 1.8–7.7)
NEUTROPHILS NFR BLD: 65.7 % (ref 38–73)
NRBC BLD-RTO: 5 /100 WBC
PLATELET # BLD AUTO: 246 K/UL (ref 150–450)
PMV BLD AUTO: 13.2 FL (ref 9.2–12.9)
POTASSIUM SERPL-SCNC: 4.6 MMOL/L (ref 3.5–5.1)
PROT SERPL-MCNC: 7.4 G/DL (ref 6–8.4)
RBC # BLD AUTO: 4.62 M/UL (ref 4.6–6.2)
SODIUM SERPL-SCNC: 128 MMOL/L (ref 136–145)
WBC # BLD AUTO: 9.88 K/UL (ref 3.9–12.7)

## 2023-10-05 PROCEDURE — 36415 COLL VENOUS BLD VENIPUNCTURE: CPT | Mod: HCNC,PO | Performed by: FAMILY MEDICINE

## 2023-10-05 PROCEDURE — 83036 HEMOGLOBIN GLYCOSYLATED A1C: CPT | Mod: HCNC | Performed by: FAMILY MEDICINE

## 2023-10-05 PROCEDURE — 80053 COMPREHEN METABOLIC PANEL: CPT | Mod: HCNC | Performed by: FAMILY MEDICINE

## 2023-10-05 PROCEDURE — 85025 COMPLETE CBC W/AUTO DIFF WBC: CPT | Mod: HCNC | Performed by: FAMILY MEDICINE

## 2023-10-06 ENCOUNTER — TELEPHONE (OUTPATIENT)
Dept: FAMILY MEDICINE | Facility: CLINIC | Age: 68
End: 2023-10-06
Payer: MEDICARE

## 2023-10-06 DIAGNOSIS — N17.9 AKI (ACUTE KIDNEY INJURY): Primary | ICD-10-CM

## 2023-10-06 NOTE — TELEPHONE ENCOUNTER
On call note:     Critical gluco >500. Nl bicarb. Mild LUDWIG.   I recommend ED for IV fluids. WIll need medication adjustment.     Called both numbers listed in chart. No answer. Left voice mails.     Will need to be addressed tomorrow.     Marco Antonio Riggins MD

## 2023-10-06 NOTE — TELEPHONE ENCOUNTER
Call placed to patient at contact numbers [phone]  and [phone]. No answer received. Left detailed message (including date and time of call) requesting return call to office. Also sent [facility] portal message to patient for notification at this time.

## 2023-10-06 NOTE — TELEPHONE ENCOUNTER
Patient's wife (Edward) contacted office. Call transferred directly to writer per patient access representative Clemencia Duggan.   Mrs. Leon states patient went to [facility] in [place], and was advised they do not perform external labs.  Mrs. Leon requesting to have labs performed at [facility] in [place], writer was unable to locate a lab appointment in this location for today's date. First available appointment noted on Tuesday 10-10-23.  Writer advised Mrs. Leon location does not appear to be open on today.  Mrs. Leon states patient has had labs performed in [place] at [facility].  States patient will go to this location to have labs performed. Lab order sent to [facility] electronically at this time.       Reprint Order Requisition    BASIC METABOLIC PANEL (Order #[id number]) on 10/6/23     Lab Collection Information    Specimen Type: Blood   Specimen Source: Blood        Lab Exception Handling Use Only - Not for Clinical Use    BASIC METABOLIC PANEL (Order #[id number]) on 10/6/23    Lab Information    Lab   [facility]            Epic Interface Status    ORDER SENT

## 2023-10-06 NOTE — TELEPHONE ENCOUNTER
----- Message from Marco Antonio Riggins MD sent at 10/6/2023  7:44 AM CDT -----  I took a call last night for this patient of Dr. Agarwal. I want him to go to the ER. He didn't answer.

## 2023-10-07 LAB
BUN SERPL-MCNC: 16 MG/DL (ref 7–25)
BUN/CREAT SERPL: ABNORMAL (CALC) (ref 6–22)
CALCIUM SERPL-MCNC: 9.2 MG/DL (ref 8.6–10.3)
CHLORIDE SERPL-SCNC: 102 MMOL/L (ref 98–110)
CO2 SERPL-SCNC: 27 MMOL/L (ref 20–32)
CREAT SERPL-MCNC: 1.34 MG/DL (ref 0.7–1.35)
EGFR: 58 ML/MIN/1.73M2
GLUCOSE SERPL-MCNC: 337 MG/DL (ref 65–99)
POTASSIUM SERPL-SCNC: 5.3 MMOL/L (ref 3.5–5.3)
SODIUM SERPL-SCNC: 135 MMOL/L (ref 135–146)

## 2023-10-09 DIAGNOSIS — R73.9 HYPERGLYCEMIA: Primary | ICD-10-CM

## 2023-10-09 RX ORDER — LANCETS
EACH MISCELLANEOUS
Qty: 200 EACH | Refills: 11 | Status: SHIPPED | OUTPATIENT
Start: 2023-10-09

## 2023-10-09 RX ORDER — INSULIN GLARGINE 300 U/ML
20 INJECTION, SOLUTION SUBCUTANEOUS DAILY
Qty: 2 ML | Refills: 11 | Status: SHIPPED | OUTPATIENT
Start: 2023-10-09 | End: 2023-11-07

## 2023-10-09 RX ORDER — INSULIN PUMP SYRINGE, 3 ML
EACH MISCELLANEOUS
Qty: 1 EACH | Refills: 0 | Status: SHIPPED | OUTPATIENT
Start: 2023-10-09

## 2023-10-09 RX ORDER — PEN NEEDLE, DIABETIC 30 GX3/16"
1 NEEDLE, DISPOSABLE MISCELLANEOUS DAILY
Qty: 100 EACH | Refills: 11 | Status: SHIPPED | OUTPATIENT
Start: 2023-10-09 | End: 2024-10-08

## 2023-10-12 ENCOUNTER — TELEPHONE (OUTPATIENT)
Dept: FAMILY MEDICINE | Facility: CLINIC | Age: 68
End: 2023-10-12
Payer: MEDICARE

## 2023-10-12 ENCOUNTER — LAB VISIT (OUTPATIENT)
Dept: PRIMARY CARE CLINIC | Facility: CLINIC | Age: 68
End: 2023-10-12
Payer: MEDICARE

## 2023-10-12 ENCOUNTER — CLINICAL SUPPORT (OUTPATIENT)
Dept: PRIMARY CARE CLINIC | Facility: CLINIC | Age: 68
End: 2023-10-12
Payer: MEDICARE

## 2023-10-12 DIAGNOSIS — R73.9 HYPERGLYCEMIA: ICD-10-CM

## 2023-10-12 LAB
ALBUMIN SERPL BCP-MCNC: 3.6 G/DL (ref 3.5–5.2)
ALP SERPL-CCNC: 80 U/L (ref 55–135)
ALT SERPL W/O P-5'-P-CCNC: 12 U/L (ref 10–44)
ANION GAP SERPL CALC-SCNC: 8 MMOL/L (ref 8–16)
AST SERPL-CCNC: 19 U/L (ref 10–40)
BILIRUB SERPL-MCNC: 1.9 MG/DL (ref 0.1–1)
BUN SERPL-MCNC: 13 MG/DL (ref 8–23)
CALCIUM SERPL-MCNC: 8.8 MG/DL (ref 8.7–10.5)
CHLORIDE SERPL-SCNC: 105 MMOL/L (ref 95–110)
CO2 SERPL-SCNC: 20 MMOL/L (ref 23–29)
CREAT SERPL-MCNC: 1.5 MG/DL (ref 0.5–1.4)
EST. GFR  (NO RACE VARIABLE): 50.7 ML/MIN/1.73 M^2
GLUCOSE SERPL-MCNC: 258 MG/DL (ref 70–110)
POTASSIUM SERPL-SCNC: 5.2 MMOL/L (ref 3.5–5.1)
PROT SERPL-MCNC: 6.9 G/DL (ref 6–8.4)
SODIUM SERPL-SCNC: 133 MMOL/L (ref 136–145)

## 2023-10-12 PROCEDURE — 81001 URINALYSIS AUTO W/SCOPE: CPT | Mod: HCNC | Performed by: FAMILY MEDICINE

## 2023-10-12 PROCEDURE — 36415 PR COLLECTION VENOUS BLOOD,VENIPUNCTURE: ICD-10-PCS | Mod: S$GLB,,, | Performed by: FAMILY MEDICINE

## 2023-10-12 PROCEDURE — 80053 COMPREHEN METABOLIC PANEL: CPT | Mod: HCNC | Performed by: FAMILY MEDICINE

## 2023-10-12 PROCEDURE — 36415 COLL VENOUS BLD VENIPUNCTURE: CPT | Mod: S$GLB,,, | Performed by: FAMILY MEDICINE

## 2023-10-12 NOTE — PROGRESS NOTES
Venipuncture performed with 21 gauge butterfly, x's 1 attempt.  Successful venipuncture to L Basilic vein.  Specimens collected per orders.      Pressure dressing applied to site, instructed patient to remove dressing in 10-15 minutes, OK to re-adjust dressing if pressure causing any discomfort, to observe closely for numbness and/or discoloration to hand or fingers, and to notify provider if bleeding persists after applying constant pressure lasting 30 minutes.

## 2023-10-12 NOTE — TELEPHONE ENCOUNTER
Ana Robles Staff    ----- Message from Ana Robles sent at 10/12/2023  8:21 AM CDT -----  Contact: Kobi friend  Patients friend Kobi is kristiq a call back to [facility] his nurse visit appt this morning.  Call back at [phone] and thanks

## 2023-10-12 NOTE — TELEPHONE ENCOUNTER
--Rescheduled nurse visit to the date of 10-17-23 as per request.     --Patient's spouse (Edward) states patient needs refill of glucose test strips.  Upon further assessment it was noted prescription for test strips was e-scribed to pharmacy on 10-9-23 quantity 200 with 11 additional refills.  Call placed to [facility], spoke to Janette who states prescription is ready for .  This has been fully communicated to MsWilliam Edward who verbalized understanding.

## 2023-10-13 LAB
BACTERIA #/AREA URNS AUTO: ABNORMAL /HPF
BILIRUB UR QL STRIP: NEGATIVE
CLARITY UR REFRACT.AUTO: CLEAR
COLOR UR AUTO: YELLOW
GLUCOSE UR QL STRIP: ABNORMAL
HGB UR QL STRIP: ABNORMAL
HYALINE CASTS UR QL AUTO: 2 /LPF
KETONES UR QL STRIP: NEGATIVE
LEUKOCYTE ESTERASE UR QL STRIP: ABNORMAL
MICROSCOPIC COMMENT: ABNORMAL
NITRITE UR QL STRIP: NEGATIVE
PH UR STRIP: 5 [PH] (ref 5–8)
PROT UR QL STRIP: NEGATIVE
RBC #/AREA URNS AUTO: 2 /HPF (ref 0–4)
SP GR UR STRIP: 1.01 (ref 1–1.03)
SQUAMOUS #/AREA URNS AUTO: 0 /HPF
UNSPECIFIED CRY UR QL COMP ASSIST: 1
URN SPEC COLLECT METH UR: ABNORMAL
WBC #/AREA URNS AUTO: 30 /HPF (ref 0–5)
WBC CLUMPS UR QL AUTO: ABNORMAL
YEAST UR QL AUTO: ABNORMAL

## 2023-11-02 DIAGNOSIS — E11.42 TYPE 2 DIABETES MELLITUS WITH DIABETIC POLYNEUROPATHY: ICD-10-CM

## 2023-11-03 RX ORDER — METFORMIN HYDROCHLORIDE 500 MG/1
500 TABLET, EXTENDED RELEASE ORAL 2 TIMES DAILY WITH MEALS
Qty: 180 TABLET | Refills: 0 | OUTPATIENT
Start: 2023-11-03

## 2023-11-03 NOTE — TELEPHONE ENCOUNTER
Refill Decision Note   Royer Motley  is requesting a refill authorization.  Brief Assessment and Rationale for Refill:  Quick Discontinue     Medication Therapy Plan:         Comments:     Note composed:6:42 AM 11/03/2023             Patient/Caregiver provided printed discharge information.

## 2023-11-03 NOTE — TELEPHONE ENCOUNTER
No care due was identified.  Health [facility] Embedded Care Due Messages. Reference number: [id number].   11/02/2023 8:10:37 PM CDT

## 2023-11-07 ENCOUNTER — PATIENT MESSAGE (OUTPATIENT)
Dept: ADMINISTRATIVE | Facility: HOSPITAL | Age: 68
End: 2023-11-07
Payer: MEDICARE

## 2023-11-07 ENCOUNTER — OFFICE VISIT (OUTPATIENT)
Dept: FAMILY MEDICINE | Facility: CLINIC | Age: 68
End: 2023-11-07
Payer: MEDICARE

## 2023-11-07 VITALS
TEMPERATURE: 98 F | SYSTOLIC BLOOD PRESSURE: 112 MMHG | HEART RATE: 66 BPM | DIASTOLIC BLOOD PRESSURE: 62 MMHG | WEIGHT: 174.63 LBS | RESPIRATION RATE: 18 BRPM | OXYGEN SATURATION: 98 % | BODY MASS INDEX: 25 KG/M2 | HEIGHT: 70 IN

## 2023-11-07 DIAGNOSIS — E11.65 TYPE 2 DIABETES MELLITUS WITH HYPERGLYCEMIA, WITHOUT LONG-TERM CURRENT USE OF INSULIN: Primary | ICD-10-CM

## 2023-11-07 PROCEDURE — 3288F PR FALLS RISK ASSESSMENT DOCUMENTED: ICD-10-PCS | Mod: HCNC,CPTII,S$GLB, | Performed by: FAMILY MEDICINE

## 2023-11-07 PROCEDURE — 1101F PT FALLS ASSESS-DOCD LE1/YR: CPT | Mod: HCNC,CPTII,S$GLB, | Performed by: FAMILY MEDICINE

## 2023-11-07 PROCEDURE — 1101F PR PT FALLS ASSESS DOC 0-1 FALLS W/OUT INJ PAST YR: ICD-10-PCS | Mod: HCNC,CPTII,S$GLB, | Performed by: FAMILY MEDICINE

## 2023-11-07 PROCEDURE — 1125F AMNT PAIN NOTED PAIN PRSNT: CPT | Mod: HCNC,CPTII,S$GLB, | Performed by: FAMILY MEDICINE

## 2023-11-07 PROCEDURE — 3046F HEMOGLOBIN A1C LEVEL >9.0%: CPT | Mod: HCNC,CPTII,S$GLB, | Performed by: FAMILY MEDICINE

## 2023-11-07 PROCEDURE — 99214 OFFICE O/P EST MOD 30 MIN: CPT | Mod: HCNC,S$GLB,, | Performed by: FAMILY MEDICINE

## 2023-11-07 PROCEDURE — 99999 PR PBB SHADOW E&M-EST. PATIENT-LVL V: ICD-10-PCS | Mod: PBBFAC,HCNC,, | Performed by: FAMILY MEDICINE

## 2023-11-07 PROCEDURE — 3078F PR MOST RECENT DIASTOLIC BLOOD PRESSURE < 80 MM HG: ICD-10-PCS | Mod: HCNC,CPTII,S$GLB, | Performed by: FAMILY MEDICINE

## 2023-11-07 PROCEDURE — 1159F MED LIST DOCD IN RCRD: CPT | Mod: HCNC,CPTII,S$GLB, | Performed by: FAMILY MEDICINE

## 2023-11-07 PROCEDURE — 1125F PR PAIN SEVERITY QUANTIFIED, PAIN PRESENT: ICD-10-PCS | Mod: HCNC,CPTII,S$GLB, | Performed by: FAMILY MEDICINE

## 2023-11-07 PROCEDURE — 3288F FALL RISK ASSESSMENT DOCD: CPT | Mod: HCNC,CPTII,S$GLB, | Performed by: FAMILY MEDICINE

## 2023-11-07 PROCEDURE — 3046F PR MOST RECENT HEMOGLOBIN A1C LEVEL > 9.0%: ICD-10-PCS | Mod: HCNC,CPTII,S$GLB, | Performed by: FAMILY MEDICINE

## 2023-11-07 PROCEDURE — 3008F PR BODY MASS INDEX (BMI) DOCUMENTED: ICD-10-PCS | Mod: HCNC,CPTII,S$GLB, | Performed by: FAMILY MEDICINE

## 2023-11-07 PROCEDURE — 3074F SYST BP LT 130 MM HG: CPT | Mod: HCNC,CPTII,S$GLB, | Performed by: FAMILY MEDICINE

## 2023-11-07 PROCEDURE — 1159F PR MEDICATION LIST DOCUMENTED IN MEDICAL RECORD: ICD-10-PCS | Mod: HCNC,CPTII,S$GLB, | Performed by: FAMILY MEDICINE

## 2023-11-07 PROCEDURE — 99214 PR OFFICE/OUTPT VISIT, EST, LEVL IV, 30-39 MIN: ICD-10-PCS | Mod: HCNC,S$GLB,, | Performed by: FAMILY MEDICINE

## 2023-11-07 PROCEDURE — 3074F PR MOST RECENT SYSTOLIC BLOOD PRESSURE < 130 MM HG: ICD-10-PCS | Mod: HCNC,CPTII,S$GLB, | Performed by: FAMILY MEDICINE

## 2023-11-07 PROCEDURE — 3008F BODY MASS INDEX DOCD: CPT | Mod: HCNC,CPTII,S$GLB, | Performed by: FAMILY MEDICINE

## 2023-11-07 PROCEDURE — 99999 PR PBB SHADOW E&M-EST. PATIENT-LVL V: CPT | Mod: PBBFAC,HCNC,, | Performed by: FAMILY MEDICINE

## 2023-11-07 PROCEDURE — 3078F DIAST BP <80 MM HG: CPT | Mod: HCNC,CPTII,S$GLB, | Performed by: FAMILY MEDICINE

## 2023-11-07 NOTE — PATIENT INSTRUCTIONS
Marv Linton,     If you are due for any health screening(s) below please notify me so we can arrange them to be ordered and scheduled to maintain your health. Most healthy patients complete it. Don't lose out on improving your health.     Tests to Keep You Healthy    Eye Exam: Met on 12/6/2022  Colon Cancer Screening: Met on 12/6/2022  Last HbA1c < 8 (10/05/2023): NO                      A1c every 3-6 months, lipid panel every year, microalbumin (urine test) once per year, comprehensive foot exam once per year, dilated eye exam at least once per year, dental exam every 6 months, flu vaccination every year, and pneumonia vaccination(s) as recommended

## 2023-11-07 NOTE — PROGRESS NOTES
Subjective:   Patient ID: Royer Motley is a 68 y.o. male     Chief Complaint:Follow-up (1 month f/u)      Here for checkup. Notes blood sugar improving with healthy diet. Eliminated cokes and sweets.     Follow-up  Pertinent negatives include no abdominal pain or chest pain.     Review of Systems   Respiratory:  Negative for shortness of breath.    Cardiovascular:  Negative for chest pain.   Gastrointestinal:  Negative for abdominal pain.   Genitourinary:  Negative for dysuria.     Past Medical History:   Diagnosis Date    Avascular necrosis of hip     Diabetes mellitus, type 2     Enlarged prostate     Sickle cell hemoglobin C disease      Past Surgical History:   Procedure Laterality Date    ANKLE DEBRIDEMENT Right     CHOLECYSTECTOMY      MANDIBLE FRACTURE SURGERY      ROBOT-ASSISTED LAPAROSCOPIC SIMPLE PROSTATECTOMY USING DA CHANDLER XI N/A 8/17/2023    Procedure: XI ROBOTIC PROSTATECTOMY, SIMPLE;  Surgeon: Aung Pollock MD;  Location: [facility];  Service: Urology;  Laterality: N/A;     Objective:     Vitals:    11/07/23 0946   BP: 112/62   Pulse: 66   Resp: 18   Temp: 97.9 °F (36.6 °C)     Body mass index is 25.05 kg/m².  Physical Exam  Vitals and nursing note reviewed.   Constitutional:       Appearance: He is well-developed.   HENT:      Head: Normocephalic and atraumatic.   Eyes:      General: No scleral icterus.     Conjunctiva/sclera: Conjunctivae normal.   Pulmonary:      Effort: Pulmonary effort is normal. No respiratory distress.   Musculoskeletal:         General: No deformity. Normal range of motion.      Cervical back: Normal range of motion and neck supple.   Skin:     Coloration: Skin is not pale.      Findings: No rash.   Neurological:      Mental Status: He is alert and oriented to person, place, and time.   Psychiatric:         Behavior: Behavior normal.         Thought Content: Thought content normal.         Judgment: Judgment normal.       Assessment:     1. Type 2 diabetes mellitus  with hyperglycemia, without long-term current use of insulin      Plan:   Type 2 diabetes mellitus with hyperglycemia, without long-term current use of insulin  -     Comprehensive Metabolic Panel; Future; Expected date: 11/07/2023  -     Hemoglobin A1C; Future; Expected date: 11/07/2023  -     Microalbumin/Creatinine Ratio, Urine; Future; Expected date: 11/07/2023  -     Lipid Panel; Future; Expected date: 11/07/2023            Total time spent of Greater than 30 minutes minutes on the day of the visit.This includes face to face time and preparing to see the patient, obtaining and reviewing separately obtained history, documenting clinical information in the electronic or other health record, independently interpreting results and communicating results to the patient/family/caregiver, or care coordinator.    Established patient with me has been instructed that must see me at least 1 time yearly (every 365 days) for refills of medications. Seeing other providers in this clinic is fine but expectation is to see me yearly.    Nikko Agarwal MD  11/07/2023    Portions of this note have been dictated with MARÍA ELENA Patterson

## 2023-11-16 ENCOUNTER — LAB VISIT (OUTPATIENT)
Dept: PRIMARY CARE CLINIC | Facility: CLINIC | Age: 68
End: 2023-11-16
Payer: MEDICARE

## 2023-11-16 DIAGNOSIS — E11.65 TYPE 2 DIABETES MELLITUS WITH HYPERGLYCEMIA, WITHOUT LONG-TERM CURRENT USE OF INSULIN: ICD-10-CM

## 2023-11-16 LAB
ALBUMIN SERPL BCP-MCNC: 3.8 G/DL (ref 3.5–5.2)
ALBUMIN/CREAT UR: 39.8 UG/MG (ref 0–30)
ALP SERPL-CCNC: 83 U/L (ref 55–135)
ALT SERPL W/O P-5'-P-CCNC: 23 U/L (ref 10–44)
ANION GAP SERPL CALC-SCNC: 8 MMOL/L (ref 8–16)
AST SERPL-CCNC: 22 U/L (ref 10–40)
BILIRUB SERPL-MCNC: 2.2 MG/DL (ref 0.1–1)
BUN SERPL-MCNC: 20 MG/DL (ref 8–23)
CALCIUM SERPL-MCNC: 9.1 MG/DL (ref 8.7–10.5)
CHLORIDE SERPL-SCNC: 105 MMOL/L (ref 95–110)
CHOLEST SERPL-MCNC: 116 MG/DL (ref 120–199)
CHOLEST/HDLC SERPL: 3.2 {RATIO} (ref 2–5)
CO2 SERPL-SCNC: 25 MMOL/L (ref 23–29)
CREAT SERPL-MCNC: 1.4 MG/DL (ref 0.5–1.4)
CREAT UR-MCNC: 83 MG/DL (ref 23–375)
EST. GFR  (NO RACE VARIABLE): 55.1 ML/MIN/1.73 M^2
ESTIMATED AVG GLUCOSE: 174 MG/DL (ref 68–131)
GLUCOSE SERPL-MCNC: 186 MG/DL (ref 70–110)
HBA1C MFR BLD: 7.7 % (ref 4–5.6)
HDLC SERPL-MCNC: 36 MG/DL (ref 40–75)
HDLC SERPL: 31 % (ref 20–50)
LDLC SERPL CALC-MCNC: 58 MG/DL (ref 63–159)
MICROALBUMIN UR DL<=1MG/L-MCNC: 33 UG/ML
NONHDLC SERPL-MCNC: 80 MG/DL
POTASSIUM SERPL-SCNC: 5.1 MMOL/L (ref 3.5–5.1)
PROT SERPL-MCNC: 7.3 G/DL (ref 6–8.4)
SODIUM SERPL-SCNC: 138 MMOL/L (ref 136–145)
TRIGL SERPL-MCNC: 110 MG/DL (ref 30–150)

## 2023-11-16 PROCEDURE — 36415 PR COLLECTION VENOUS BLOOD,VENIPUNCTURE: ICD-10-PCS | Mod: S$GLB,,, | Performed by: FAMILY MEDICINE

## 2023-11-16 PROCEDURE — 80053 COMPREHEN METABOLIC PANEL: CPT | Mod: HCNC | Performed by: FAMILY MEDICINE

## 2023-11-16 PROCEDURE — 82043 UR ALBUMIN QUANTITATIVE: CPT | Mod: HCNC | Performed by: FAMILY MEDICINE

## 2023-11-16 PROCEDURE — 83036 HEMOGLOBIN GLYCOSYLATED A1C: CPT | Mod: HCNC | Performed by: FAMILY MEDICINE

## 2023-11-16 PROCEDURE — 80061 LIPID PANEL: CPT | Mod: HCNC | Performed by: FAMILY MEDICINE

## 2023-11-16 PROCEDURE — 36415 COLL VENOUS BLD VENIPUNCTURE: CPT | Mod: S$GLB,,, | Performed by: FAMILY MEDICINE

## 2023-12-11 ENCOUNTER — TELEPHONE (OUTPATIENT)
Dept: ADMINISTRATIVE | Facility: CLINIC | Age: 68
End: 2023-12-11
Payer: MEDICARE

## 2024-01-03 NOTE — TELEPHONE ENCOUNTER
No care due was identified.  Health [facility] Embedded Care Due Messages. Reference number: [id number].   1/03/2024 4:08:27 PM CST

## 2024-01-04 NOTE — TELEPHONE ENCOUNTER
Refill Routing Note   Medication(s) are not appropriate for processing by [facility] for the following reason(s):        No active prescription written by provider    [facility] action(s):  Defer               Appointments  past 12m or future 3m with PCP    Date Provider   Last Visit   11/7/2023 Nikko Agarwal MD   Next Visit   Visit date not found Nikko Agarwal MD   ED visits in past 90 days: 0        Note composed:12:12 PM 01/04/2024

## 2024-01-05 ENCOUNTER — PATIENT MESSAGE (OUTPATIENT)
Dept: ADMINISTRATIVE | Facility: HOSPITAL | Age: 69
End: 2024-01-05
Payer: MEDICARE

## 2024-01-05 RX ORDER — ISOPROPYL ALCOHOL 0.75 G/1
SWAB TOPICAL
Qty: 100 EACH | Refills: 3 | Status: SHIPPED | OUTPATIENT
Start: 2024-01-05

## 2024-02-06 DIAGNOSIS — Z12.11 COLON CANCER SCREENING: ICD-10-CM

## 2024-03-27 DIAGNOSIS — E11.9 TYPE 2 DIABETES MELLITUS WITHOUT COMPLICATION, UNSPECIFIED WHETHER LONG TERM INSULIN USE: ICD-10-CM

## 2024-04-04 ENCOUNTER — PATIENT OUTREACH (OUTPATIENT)
Dept: ADMINISTRATIVE | Facility: HOSPITAL | Age: 69
End: 2024-04-04
Payer: MEDICARE

## 2024-04-04 NOTE — PROGRESS NOTES
Population Health Chart Review & Patient Outreach Details      Additional [facility] Health Notes:               Updates Requested / Reviewed:        Health Maintenance Topics Overdue:      VBHM Score: 3     Colon Cancer Screening  Eye Exam  Foot Exam    Influenza Vaccine  Pneumonia Vaccine  RSV Vaccine                  Health Maintenance Topic(s) Outreach Outcomes & Actions Taken:    Eye Exam - Outreach Outcomes & Actions Taken  : Eye Exam Screening Order Placed    Colorectal Cancer Screening - Outreach Outcomes & Actions Taken  : Reminded Patient to Complete Already Received Home Test

## 2024-04-04 NOTE — LETTER
April 4, 2024    Royer Motley  [address] [address]             [facility]  [address]  [place] [address]  Phone: [phone] Dear Gregory, [facility] is committed to your overall health and would like to ensure that you are up to date on your recommended test and/or procedures.   Nikko Agarwal MD has found that your chart shows you may be due for the following:    Routine Dilated Eye Exam  (Diabetic Retinopathy screening)    Colon Cancer Screening       If you have had any of the above done at another facility, please let us know so that we may obtain copies from that facility.  If you have a copy of these records, please provide a copy so that we may update your records.  Also, You are welcome to request that the report be faxed to us at   [phone].    If you have an upcoming scheduled appointment for the above test and/or procedures, please disregard this letter.  Otherwise, please contact us through your [facility] portal or by calling [phone] and we will assist in scheduling these appointments for you.      Thank you for letting us care for you,    Sincerely,    Your [facility]    Phone#  [phone]  FAX#  [phone]

## 2024-04-22 ENCOUNTER — OFFICE VISIT (OUTPATIENT)
Dept: FAMILY MEDICINE | Facility: CLINIC | Age: 69
End: 2024-04-22
Payer: MEDICARE

## 2024-04-22 VITALS
OXYGEN SATURATION: 99 % | TEMPERATURE: 98 F | SYSTOLIC BLOOD PRESSURE: 138 MMHG | HEART RATE: 58 BPM | DIASTOLIC BLOOD PRESSURE: 82 MMHG | BODY MASS INDEX: 25.15 KG/M2 | WEIGHT: 175.69 LBS | HEIGHT: 70 IN

## 2024-04-22 DIAGNOSIS — R63.4 WEIGHT LOSS: ICD-10-CM

## 2024-04-22 DIAGNOSIS — Z12.11 SCREEN FOR COLON CANCER: ICD-10-CM

## 2024-04-22 DIAGNOSIS — N18.31 TYPE 2 DIABETES MELLITUS WITH STAGE 3A CHRONIC KIDNEY DISEASE, WITHOUT LONG-TERM CURRENT USE OF INSULIN: Primary | ICD-10-CM

## 2024-04-22 DIAGNOSIS — E11.22 TYPE 2 DIABETES MELLITUS WITH STAGE 3A CHRONIC KIDNEY DISEASE, WITHOUT LONG-TERM CURRENT USE OF INSULIN: Primary | ICD-10-CM

## 2024-04-22 PROCEDURE — 1126F AMNT PAIN NOTED NONE PRSNT: CPT | Mod: CPTII,S$GLB,,

## 2024-04-22 PROCEDURE — 3075F SYST BP GE 130 - 139MM HG: CPT | Mod: CPTII,S$GLB,,

## 2024-04-22 PROCEDURE — 1160F RVW MEDS BY RX/DR IN RCRD: CPT | Mod: CPTII,S$GLB,,

## 2024-04-22 PROCEDURE — 99999 PR PBB SHADOW E&M-EST. PATIENT-LVL V: CPT | Mod: PBBFAC,,,

## 2024-04-22 PROCEDURE — 1159F MED LIST DOCD IN RCRD: CPT | Mod: CPTII,S$GLB,,

## 2024-04-22 PROCEDURE — 3079F DIAST BP 80-89 MM HG: CPT | Mod: CPTII,S$GLB,,

## 2024-04-22 PROCEDURE — 99214 OFFICE O/P EST MOD 30 MIN: CPT | Mod: S$GLB,,,

## 2024-04-22 PROCEDURE — 3008F BODY MASS INDEX DOCD: CPT | Mod: CPTII,S$GLB,,

## 2024-04-22 NOTE — PROGRESS NOTES
Subjective:       Patient ID: Royer Motley is a 68 y.o. male.    Chief Complaint: 6 month follow up       Royer Motley is a 68 y.o. male with history of DM2 who presents to clinic for 6 month follow up. Doing well overall. Reports concern over weight loss since starting Metformin. HgbA1c 7.7 on labs from November. States he eats primarily vegetarian diet, but occasionally/ rarely eats chicken. Per chart review, pt has lost about 20 lbs in the past year. States he has been going to the gym regularly as well. Inquires about ways to gain weight. Denies poor appetite or drenching night sweats. Following with pain management for chronic pain.         Review of Systems   Constitutional:  Negative for appetite change, chills, diaphoresis, fatigue and fever.   Respiratory:  Negative for shortness of breath.    Cardiovascular:  Negative for chest pain.   Neurological:  Negative for headaches.         Past Medical History:   Diagnosis Date    Avascular necrosis of hip     Diabetes mellitus, type 2     Enlarged prostate     Sickle cell hemoglobin C disease        Review of patient's allergies indicates:  No Known Allergies      Current Outpatient Medications:     BD ALCOHOL SWABS PadM, USE TO test blood glucose, Disp: 100 each, Rfl: 3    blood sugar diagnostic Strp, Apply 1 strip topically once daily. Check blood glucose 2 times a day. E11.42, Disp: 100 strip, Rfl: 3    blood sugar diagnostic Strp, To check BG 3 times daily, to use with insurance preferred meter, Disp: 200 strip, Rfl: 11    blood-glucose meter kit, To check BG 3 times daily, to use with insurance preferred meter, Disp: 1 each, Rfl: 0    docusate sodium (COLACE) 100 MG capsule, Take 1 capsule (100 mg total) by mouth 2 (two) times daily., Disp: , Rfl: 0    gabapentin (NEURONTIN) 300 MG capsule, TAKE ONE CAPSULE BY MOUTH EVERY EVENING, Disp: 90 capsule, Rfl: 3    glimepiride (AMARYL) 1 MG tablet, TAKE ONE TABLET BY MOUTH DAILY WITH BREAKFAST same  "medicine, color or shape may be different, Disp: 90 tablet, Rfl: 0    lancets Misc, To check BG 3 times daily, to use with insurance preferred meter, Disp: 200 each, Rfl: 11    metFORMIN (GLUCOPHAGE-XR) 500 MG ER 24hr tablet, Take 1 tablet (500 mg total) by mouth 2 (two) times daily with meals., Disp: 180 tablet, Rfl: 1    morphine (MS CONTIN) 15 MG 12 hr tablet, Take 15 mg by mouth once daily., Disp: , Rfl: 0    multivitamin capsule, Take 1 capsule by mouth once daily., Disp: , Rfl:     oxybutynin (DITROPAN) 5 MG Tab, Take 1 tablet (5 mg total) by mouth 3 (three) times daily as needed (urgency)., Disp: 30 tablet, Rfl: 1    oxyCODONE (ROXICODONE) 30 MG Tab, Take 30 mg by mouth 4 (four) times daily as needed., Disp: , Rfl:     pen needle, diabetic 32 gauge x 5/32" Ndle, 1 Application by Misc.(Non-Drug; Combo Route) route Daily., Disp: 100 each, Rfl: 11    SHINGRIX, PF, 50 mcg/0.5 mL injection, , Disp: , Rfl:     TRUEPLUS LANCETS 33 gauge Misc, USE WITH LANCET DEVICE TO CHECK BLOOD GLUCOSE TWICE DAILY AS DIRECTED, Disp: 100 each, Rfl: 0    Objective:        Physical Exam  Vitals reviewed.   Constitutional:       Appearance: Normal appearance. He is normal weight.   HENT:      Head: Normocephalic and atraumatic.   Eyes:      Conjunctiva/sclera: Conjunctivae normal.   Cardiovascular:      Rate and Rhythm: Normal rate and regular rhythm.      Heart sounds: Normal heart sounds.   Pulmonary:      Effort: Pulmonary effort is normal.      Breath sounds: Normal breath sounds.   Musculoskeletal:         General: Normal range of motion.      Cervical back: Normal range of motion and neck supple.   Skin:     General: Skin is warm and dry.   Neurological:      Mental Status: He is oriented to person, place, and time.           Visit Vitals  /82 (BP Location: Right arm, Patient Position: Sitting, BP Method: Small (Manual))   Pulse (!) 58   Temp 97.8 °F (36.6 °C) (Oral)   Ht 5' 10" (1.778 m)   Wt 79.7 kg (175 lb 11.3 oz) "   SpO2 99%   BMI 25.21 kg/m²      Assessment:         1. Type 2 diabetes mellitus with stage 3a chronic kidney disease, without long-term current use of insulin    2. Weight loss    3. Screen for colon cancer        Plan:         Diagnoses and all orders for this visit:    Type 2 diabetes mellitus with stage 3a chronic kidney disease, without long-term current use of insulin  -     CBC Auto Differential; Future  -     COMPREHENSIVE METABOLIC PANEL; Future  -     Lipid Panel; Future  -     HEMOGLOBIN A1C; Future  -     Ambulatory referral/consult to Optometry; Future  -     Stable, continue current medications.     Weight loss  -     CBC Auto Differential; Future  -     COMPREHENSIVE METABOLIC PANEL; Future  -     TSH; Future  -      Discussed high protein diet. Recommend protein shakes in between meals/ as meal supplementation. Printed out high protein foods with vegetarian diet and given to patient.     Screen for colon cancer  -     Fecal Immunochemical Test (iFOBT); Future       Follow up with PCP in 6 months.         Family Medicine Physician Assistant     Future Appointments       Date Provider Specialty Appt Notes    10/11/2024 Nikko Agarwal MD Family Medicine Annual             Tests to Keep You Healthy    Eye Exam: ORDERED BUT NOT SCHEDULED  Colon Cancer Screening: ORDERED  Last HbA1c < 8 (11/16/2023): Yes       I spent a total of 20 minutes on the day of the visit.This includes face to face time and non-face to face time preparing to see the patient (eg, review of tests), obtaining and/or reviewing separately obtained history, documenting clinical information in the electronic or other health record, independently interpreting results and communicating results to the patient/family/caregiver, or care coordinator.    We have addressed [4] Moderate: 1 or more chronic illnesses with exacerbation, progression, or side effects of treatment / 2 or more stable chronic illnesses / 1 undiagnosed new problem  with uncertain prognosis / 1 acute illness with systemic symptoms / 1 acute complicated injury  The complexity of the data reviewed and analyzed for this visit was [4] Moderate (Reviewed prior external note, ordered unique testing and reviewed the results of each unique test / Independently interpreted a test / Discussed management or interpretation of a test with another provider)  The risk of complications and/or morbidity or mortality are [4] Moderate risk (I.e. prescription drug management / decision regarding minor surgery with identified pt or procedure risk factors / decision regarding elective major surgery without identified pt or procedure risk factors / diagnosis or treatment significantly limited by social determinants of health)   The level of Medical Decision Making for this visit is [4] Moderate

## 2024-04-22 NOTE — PATIENT INSTRUCTIONS
Marv Linton,     If you are due for any health screening(s) below please notify me so we can arrange them to be ordered and scheduled. Most healthy patients at your age complete them, but you are free to accept or refuse.     If you can't do it, I'll definitely understand. If you can, I'd certainly appreciate it!    Tests to Keep You Healthy    Eye Exam: ORDERED BUT NOT SCHEDULED  Colon Cancer Screening: ORDERED  Last HbA1c < 8 (11/16/2023): Yes      Its time for your colon cancer screening     Colorectal cancer is one of the leading causes of cancer death for men and women but it doesnt have to be. Screenings can prevent colorectal cancer or find it early enough to treat and cure the disease.     Our records indicate that you may be overdue for colon cancer screening. A colonoscopy or stool screening test can help identify patients at risk for developing colon cancer. Cancer screenings save lives, so schedule yours today to stay healthy.     A colonoscopy is the preferred test for detecting colon cancer. It is needed only once every 10 years if results are negative. While you are sedated, a flexible, lighted tube with a tiny camera is inserted into the rectum and advanced through the colon to look for cancers.     An alternative screening test that is used at home and returned to the lab may also be used. It detects hidden blood in bowel movements which could indicate cancer in the colon. If results are positive, you will need a colonoscopy to determine if the blood is a sign of cancer. This type of follow up (diagnostic) colonoscopy usually requires additional copays as required by your insurance provider.     If you recently had your colon cancer screening performed outside of [facility], please let your Health care team know so that they can update your health record. Please contact your PCP if you have any questions.    Your diabetic retinal eye exam is due     Diabetes is the #1 cause of blindness  in the [place] - early detection before signs or symptoms develop can prevent debilitating blindness.     Our records indicate that you may be overdue for your annual diabetic eye exam. Eye screening can help identify patients at risk for developing vision loss which is common in diabetes. This simple screening is an important step to keeping you healthy and preventing complications from diabetes.     This recommended diabetic eye exam should take place once per year and can prevent and treat diabetes complications in the eye before developing symptoms. This can be done with a special camera is used to take photographs of the back of your eye without having to dilate them, or you can see an eye doctor for a full dilated exam.     If you recently had your yearly diabetic eye exam performed outside of [facility], please let your Health care team know so that they can update your health record.

## 2024-04-25 ENCOUNTER — LAB VISIT (OUTPATIENT)
Dept: LAB | Facility: HOSPITAL | Age: 69
End: 2024-04-25
Payer: MEDICARE

## 2024-04-25 DIAGNOSIS — N18.31 TYPE 2 DIABETES MELLITUS WITH STAGE 3A CHRONIC KIDNEY DISEASE, WITHOUT LONG-TERM CURRENT USE OF INSULIN: ICD-10-CM

## 2024-04-25 DIAGNOSIS — E11.22 TYPE 2 DIABETES MELLITUS WITH STAGE 3A CHRONIC KIDNEY DISEASE, WITHOUT LONG-TERM CURRENT USE OF INSULIN: ICD-10-CM

## 2024-04-25 DIAGNOSIS — R63.4 WEIGHT LOSS: ICD-10-CM

## 2024-04-25 LAB
ALBUMIN SERPL BCP-MCNC: 3.6 G/DL (ref 3.5–5.2)
ALP SERPL-CCNC: 199 U/L (ref 55–135)
ALT SERPL W/O P-5'-P-CCNC: 15 U/L (ref 10–44)
ANION GAP SERPL CALC-SCNC: 7 MMOL/L (ref 8–16)
AST SERPL-CCNC: 14 U/L (ref 10–40)
BASOPHILS # BLD AUTO: 0.07 K/UL (ref 0–0.2)
BASOPHILS NFR BLD: 0.7 % (ref 0–1.9)
BILIRUB SERPL-MCNC: 2.1 MG/DL (ref 0.1–1)
BUN SERPL-MCNC: 17 MG/DL (ref 8–23)
CALCIUM SERPL-MCNC: 8.7 MG/DL (ref 8.7–10.5)
CHLORIDE SERPL-SCNC: 100 MMOL/L (ref 95–110)
CHOLEST SERPL-MCNC: 137 MG/DL (ref 120–199)
CHOLEST/HDLC SERPL: 3.5 {RATIO} (ref 2–5)
CO2 SERPL-SCNC: 22 MMOL/L (ref 23–29)
CREAT SERPL-MCNC: 1.6 MG/DL (ref 0.5–1.4)
DIFFERENTIAL METHOD BLD: ABNORMAL
EOSINOPHIL # BLD AUTO: 0.3 K/UL (ref 0–0.5)
EOSINOPHIL NFR BLD: 2.6 % (ref 0–8)
ERYTHROCYTE [DISTWIDTH] IN BLOOD BY AUTOMATED COUNT: 14.6 % (ref 11.5–14.5)
EST. GFR  (NO RACE VARIABLE): 46.9 ML/MIN/1.73 M^2
ESTIMATED AVG GLUCOSE: 283 MG/DL (ref 68–131)
GLUCOSE SERPL-MCNC: 624 MG/DL (ref 70–110)
HBA1C MFR BLD: 11.5 % (ref 4–5.6)
HCT VFR BLD AUTO: 34.9 % (ref 40–54)
HDLC SERPL-MCNC: 39 MG/DL (ref 40–75)
HDLC SERPL: 28.5 % (ref 20–50)
HGB BLD-MCNC: 12.2 G/DL (ref 14–18)
IMM GRANULOCYTES # BLD AUTO: 0.02 K/UL (ref 0–0.04)
IMM GRANULOCYTES NFR BLD AUTO: 0.2 % (ref 0–0.5)
LDLC SERPL CALC-MCNC: 70.4 MG/DL (ref 63–159)
LYMPHOCYTES # BLD AUTO: 2.4 K/UL (ref 1–4.8)
LYMPHOCYTES NFR BLD: 23.6 % (ref 18–48)
MCH RBC QN AUTO: 26.8 PG (ref 27–31)
MCHC RBC AUTO-ENTMCNC: 35 G/DL (ref 32–36)
MCV RBC AUTO: 77 FL (ref 82–98)
MONOCYTES # BLD AUTO: 1 K/UL (ref 0.3–1)
MONOCYTES NFR BLD: 9.5 % (ref 4–15)
NEUTROPHILS # BLD AUTO: 6.6 K/UL (ref 1.8–7.7)
NEUTROPHILS NFR BLD: 63.4 % (ref 38–73)
NONHDLC SERPL-MCNC: 98 MG/DL
NRBC BLD-RTO: 4 /100 WBC
PLATELET # BLD AUTO: 266 K/UL (ref 150–450)
PMV BLD AUTO: 12.5 FL (ref 9.2–12.9)
POTASSIUM SERPL-SCNC: 5.2 MMOL/L (ref 3.5–5.1)
PROT SERPL-MCNC: 7.4 G/DL (ref 6–8.4)
RBC # BLD AUTO: 4.56 M/UL (ref 4.6–6.2)
SODIUM SERPL-SCNC: 129 MMOL/L (ref 136–145)
TRIGL SERPL-MCNC: 138 MG/DL (ref 30–150)
TSH SERPL DL<=0.005 MIU/L-ACNC: 0.94 UIU/ML (ref 0.4–4)
WBC # BLD AUTO: 10.35 K/UL (ref 3.9–12.7)

## 2024-04-25 PROCEDURE — 85025 COMPLETE CBC W/AUTO DIFF WBC: CPT | Mod: 91,HCNC

## 2024-04-25 PROCEDURE — 36415 COLL VENOUS BLD VENIPUNCTURE: CPT | Mod: HCNC,PO

## 2024-04-25 PROCEDURE — 80053 COMPREHEN METABOLIC PANEL: CPT | Mod: 91,HCNC

## 2024-04-25 PROCEDURE — 83036 HEMOGLOBIN GLYCOSYLATED A1C: CPT | Mod: HCNC

## 2024-04-25 PROCEDURE — 84443 ASSAY THYROID STIM HORMONE: CPT | Mod: HCNC

## 2024-04-25 PROCEDURE — 80061 LIPID PANEL: CPT | Mod: HCNC

## 2024-04-26 ENCOUNTER — TELEPHONE (OUTPATIENT)
Dept: FAMILY MEDICINE | Facility: CLINIC | Age: 69
End: 2024-04-26
Payer: MEDICARE

## 2024-04-26 PROBLEM — R73.9 HYPERGLYCEMIA: Status: ACTIVE | Noted: 2024-04-26

## 2024-04-26 PROBLEM — E11.65 UNCONTROLLED TYPE 2 DIABETES MELLITUS WITH HYPERGLYCEMIA: Status: ACTIVE | Noted: 2024-04-26

## 2024-04-26 NOTE — TELEPHONE ENCOUNTER
----- Message from Lala De La Cruz sent at 4/26/2024  9:49 AM CDT -----  Type:  Sooner Appointment Request    Caller is requesting a sooner appointment.  Caller declined first available appointment listed below.  Caller will not accept being placed on the waitlist and is requesting a message be sent to doctor.    Name of Caller:  nurse  When is the first available appointment?  May 13  Symptoms:    Would the patient rather a call back or a response via [facility]? call  Best Call Back Number:  [phone]    Additional Information:  Pt needs a 1 week follow up from hospital stay  Please call to advise

## 2024-04-30 ENCOUNTER — LAB VISIT (OUTPATIENT)
Dept: LAB | Facility: HOSPITAL | Age: 69
End: 2024-04-30
Payer: MEDICARE

## 2024-04-30 ENCOUNTER — OFFICE VISIT (OUTPATIENT)
Dept: FAMILY MEDICINE | Facility: CLINIC | Age: 69
End: 2024-04-30
Payer: MEDICARE

## 2024-04-30 VITALS
DIASTOLIC BLOOD PRESSURE: 74 MMHG | HEART RATE: 62 BPM | OXYGEN SATURATION: 98 % | BODY MASS INDEX: 25.41 KG/M2 | WEIGHT: 177.5 LBS | RESPIRATION RATE: 16 BRPM | SYSTOLIC BLOOD PRESSURE: 132 MMHG | HEIGHT: 70 IN

## 2024-04-30 DIAGNOSIS — E11.65 TYPE 2 DIABETES MELLITUS WITH HYPERGLYCEMIA, WITHOUT LONG-TERM CURRENT USE OF INSULIN: ICD-10-CM

## 2024-04-30 DIAGNOSIS — Z12.11 SCREEN FOR COLON CANCER: ICD-10-CM

## 2024-04-30 DIAGNOSIS — R73.9 HYPERGLYCEMIA: Primary | ICD-10-CM

## 2024-04-30 DIAGNOSIS — F11.20 OPIOID DEPENDENCE, UNCOMPLICATED: ICD-10-CM

## 2024-04-30 PROCEDURE — 3288F FALL RISK ASSESSMENT DOCD: CPT | Mod: HCNC,CPTII,S$GLB, | Performed by: FAMILY MEDICINE

## 2024-04-30 PROCEDURE — 3008F BODY MASS INDEX DOCD: CPT | Mod: HCNC,CPTII,S$GLB, | Performed by: FAMILY MEDICINE

## 2024-04-30 PROCEDURE — 3046F HEMOGLOBIN A1C LEVEL >9.0%: CPT | Mod: HCNC,CPTII,S$GLB, | Performed by: FAMILY MEDICINE

## 2024-04-30 PROCEDURE — 82274 ASSAY TEST FOR BLOOD FECAL: CPT | Mod: HCNC

## 2024-04-30 PROCEDURE — 3078F DIAST BP <80 MM HG: CPT | Mod: HCNC,CPTII,S$GLB, | Performed by: FAMILY MEDICINE

## 2024-04-30 PROCEDURE — 1126F AMNT PAIN NOTED NONE PRSNT: CPT | Mod: HCNC,CPTII,S$GLB, | Performed by: FAMILY MEDICINE

## 2024-04-30 PROCEDURE — 3075F SYST BP GE 130 - 139MM HG: CPT | Mod: HCNC,CPTII,S$GLB, | Performed by: FAMILY MEDICINE

## 2024-04-30 PROCEDURE — 99999 PR PBB SHADOW E&M-EST. PATIENT-LVL IV: CPT | Mod: PBBFAC,HCNC,, | Performed by: FAMILY MEDICINE

## 2024-04-30 PROCEDURE — 1101F PT FALLS ASSESS-DOCD LE1/YR: CPT | Mod: HCNC,CPTII,S$GLB, | Performed by: FAMILY MEDICINE

## 2024-04-30 PROCEDURE — 1159F MED LIST DOCD IN RCRD: CPT | Mod: HCNC,CPTII,S$GLB, | Performed by: FAMILY MEDICINE

## 2024-04-30 PROCEDURE — 99214 OFFICE O/P EST MOD 30 MIN: CPT | Mod: HCNC,S$GLB,, | Performed by: FAMILY MEDICINE

## 2024-04-30 RX ORDER — INSULIN PUMP SYRINGE, 3 ML
EACH MISCELLANEOUS
Qty: 1 EACH | Refills: 0 | Status: SHIPPED | OUTPATIENT
Start: 2024-04-30 | End: 2025-04-30

## 2024-04-30 RX ORDER — INSULIN GLARGINE 100 [IU]/ML
18 INJECTION, SOLUTION SUBCUTANEOUS NIGHTLY
Qty: 1 EACH | Refills: 5 | Status: SHIPPED | OUTPATIENT
Start: 2024-04-30 | End: 2025-04-30

## 2024-04-30 RX ORDER — LANCETS
EACH MISCELLANEOUS
Qty: 200 EACH | Refills: 11 | Status: SHIPPED | OUTPATIENT
Start: 2024-04-30

## 2024-04-30 NOTE — PROGRESS NOTES
Subjective:   Patient ID: Royer Motley is a 68 y.o. male     Chief Complaint:Hospital Follow Up      Here for follow up      Review of Systems   Respiratory:  Negative for shortness of breath.    Cardiovascular:  Negative for chest pain.   Gastrointestinal:  Negative for abdominal pain.   Genitourinary:  Negative for dysuria.     Past Medical History:   Diagnosis Date    Avascular necrosis of hip     Diabetes mellitus, type 2     Enlarged prostate     Sickle cell hemoglobin C disease      Past Surgical History:   Procedure Laterality Date    ANKLE DEBRIDEMENT Right     CHOLECYSTECTOMY      MANDIBLE FRACTURE SURGERY      ROBOT-ASSISTED LAPAROSCOPIC SIMPLE PROSTATECTOMY USING DA CHANDLER XI N/A 8/17/2023    Procedure: XI ROBOTIC PROSTATECTOMY, SIMPLE;  Surgeon: Aung Pollock MD;  Location: [facility];  Service: Urology;  Laterality: N/A;     Objective:     Vitals:    04/30/24 1555   BP: 132/74   Pulse: 62   Resp: 16     Body mass index is 25.46 kg/m².  Physical Exam  Vitals and nursing note reviewed.   Constitutional:       Appearance: He is well-developed.   HENT:      Head: Normocephalic and atraumatic.   Eyes:      General: No scleral icterus.     Conjunctiva/sclera: Conjunctivae normal.   Cardiovascular:      Heart sounds: No murmur heard.  Pulmonary:      Effort: Pulmonary effort is normal. No respiratory distress.   Musculoskeletal:         General: No deformity. Normal range of motion.      Cervical back: Normal range of motion and neck supple.   Skin:     Coloration: Skin is not pale.      Findings: No rash.   Neurological:      Mental Status: He is alert and oriented to person, place, and time.   Psychiatric:         Behavior: Behavior normal.         Thought Content: Thought content normal.         Judgment: Judgment normal.       Assessment:     1. Hyperglycemia    2. Opioid dependence, uncomplicated      Plan:   Hyperglycemia  -     Comprehensive Metabolic Panel; Future; Expected date: 05/14/2024  -      Hemoglobin A1C; Future; Expected date: 05/14/2024  Counseled   Opioid dependence, uncomplicated  Stable, follows with pain management          Total time spent of Greater than 30 minutes minutes on the day of the visit.This includes face to face time and preparing to see the patient, obtaining and reviewing separately obtained history, documenting clinical information in the electronic or other health record, independently interpreting results and communicating results to the patient/family/caregiver, or care coordinator.    Established patient with me has been instructed that must see me at least 1 time yearly (every 365 days) for refills of medications. Seeing other providers in this clinic is fine but expectation is to see me yearly.    Nikko Agarwal MD  04/30/2024    Portions of this note have been dictated with MARÍA ELENA Patterson

## 2024-04-30 NOTE — PATIENT INSTRUCTIONS
Marv Linton,     If you are due for any health screening(s) below please notify me so we can arrange them to be ordered and scheduled to maintain your health. Most healthy patients complete it. Don't lose out on improving your health.     Tests to Keep You Healthy    Eye Exam: ORDERED BUT NOT SCHEDULED  Colon Cancer Screening: ORDERED  Last HbA1c < 8 (04/25/2024): NO         Colon Cancer Screening    Of cancers affecting both men and women, colorectal cancer is the third leading cancer killer in the [place]. But it doesnt have to be. Screening can prevent colorectal cancer or find it at an early stage when treatment often leads to a cure.    A colonoscopy is the preferred test for detecting colon cancer. It is needed only once every 10 years if results are negative. While sedated, a flexible, lighted tube with a tiny camera is inserted into the rectum and advanced through the colon to look for cancers. An alternative screening test that is used at home and returned to the lab may also be used. It detects hidden blood in bowel movements which could indicate cancer in the colon. If results are positive, you will need a colonoscopy to determine if the blood is a sign of cancer. This type of follow up (diagnostic) colonoscopy usually requires additional copays as required by your insurance provider. Please contact your PCP if you have any questions.         Diabetic Retinal Eye Exam    Diabetes is the #1 cause of blindness in the [place] - early detection before signs or symptoms develop can prevent debilitating blindness.    Once-a-year screening is recommended for all diabetic patients. This exam can prevent and treat diabetes complications in the eye before developing symptoms. This can be done with a special camera is used to take photographs of the back of your eye without having to dilate them, or you can see an eye doctor for a full dilated exam.      A1c every 3-6 months, lipid panel every year, microalbumin  (urine test) once per year, comprehensive foot exam once per year, dilated eye exam at least once per year, dental exam every 6 months, flu vaccination every year, and pneumonia vaccination(s) as recommended

## 2024-05-07 LAB — HEMOCCULT STL QL IA: NEGATIVE

## 2024-05-14 ENCOUNTER — LAB VISIT (OUTPATIENT)
Dept: PRIMARY CARE CLINIC | Facility: CLINIC | Age: 69
End: 2024-05-14
Payer: MEDICARE

## 2024-05-14 DIAGNOSIS — R73.9 HYPERGLYCEMIA: ICD-10-CM

## 2024-05-14 LAB
ALBUMIN SERPL BCP-MCNC: 3.6 G/DL (ref 3.5–5.2)
ALP SERPL-CCNC: 91 U/L (ref 55–135)
ALT SERPL W/O P-5'-P-CCNC: 15 U/L (ref 10–44)
ANION GAP SERPL CALC-SCNC: 8 MMOL/L (ref 8–16)
AST SERPL-CCNC: 20 U/L (ref 10–40)
BILIRUB SERPL-MCNC: 2.3 MG/DL (ref 0.1–1)
BUN SERPL-MCNC: 16 MG/DL (ref 8–23)
CALCIUM SERPL-MCNC: 8.9 MG/DL (ref 8.7–10.5)
CHLORIDE SERPL-SCNC: 108 MMOL/L (ref 95–110)
CO2 SERPL-SCNC: 24 MMOL/L (ref 23–29)
CREAT SERPL-MCNC: 1.4 MG/DL (ref 0.5–1.4)
EST. GFR  (NO RACE VARIABLE): 55.1 ML/MIN/1.73 M^2
ESTIMATED AVG GLUCOSE: 203 MG/DL (ref 68–131)
GLUCOSE SERPL-MCNC: 229 MG/DL (ref 70–110)
HBA1C MFR BLD: 8.7 % (ref 4–5.6)
POTASSIUM SERPL-SCNC: 4.8 MMOL/L (ref 3.5–5.1)
PROT SERPL-MCNC: 7.1 G/DL (ref 6–8.4)
SODIUM SERPL-SCNC: 140 MMOL/L (ref 136–145)

## 2024-05-14 PROCEDURE — 80053 COMPREHEN METABOLIC PANEL: CPT | Mod: HCNC | Performed by: FAMILY MEDICINE

## 2024-05-14 PROCEDURE — 36415 COLL VENOUS BLD VENIPUNCTURE: CPT | Mod: S$GLB,,, | Performed by: FAMILY MEDICINE

## 2024-05-14 PROCEDURE — 83036 HEMOGLOBIN GLYCOSYLATED A1C: CPT | Mod: HCNC | Performed by: FAMILY MEDICINE

## 2024-05-15 DIAGNOSIS — E11.65 UNCONTROLLED TYPE 2 DIABETES MELLITUS WITH HYPERGLYCEMIA: Primary | ICD-10-CM

## 2024-06-03 DIAGNOSIS — E11.42 TYPE 2 DIABETES MELLITUS WITH DIABETIC POLYNEUROPATHY: ICD-10-CM

## 2024-06-03 RX ORDER — METFORMIN HYDROCHLORIDE 500 MG/1
500 TABLET, EXTENDED RELEASE ORAL 2 TIMES DAILY WITH MEALS
Qty: 180 TABLET | Refills: 1 | Status: SHIPPED | OUTPATIENT
Start: 2024-06-03

## 2024-06-03 NOTE — TELEPHONE ENCOUNTER
No care due was identified.  [facility] Embedded Care Due Messages. Reference number: [id number].   6/03/2024 9:41:02 AM CDT

## 2024-06-03 NOTE — TELEPHONE ENCOUNTER
Refill Authorization Note     Refill Decision Note   Royer Motley  is requesting a refill authorization.  Brief Assessment and Rationale for Refill:  Approve     Medication Therapy Plan:       Medication Reconciliation Completed: No   Comments:     No Care Gaps recommended.     Note composed:2:38 PM 06/03/2024

## 2024-06-13 ENCOUNTER — OFFICE VISIT (OUTPATIENT)
Dept: OPTOMETRY | Facility: CLINIC | Age: 69
End: 2024-06-13
Payer: MEDICARE

## 2024-06-13 DIAGNOSIS — H47.011 ISCHEMIC OPTIC NEUROPATHY, RIGHT: ICD-10-CM

## 2024-06-13 DIAGNOSIS — H25.13 NUCLEAR SCLEROSIS OF BOTH EYES: ICD-10-CM

## 2024-06-13 DIAGNOSIS — E11.3293 MILD NONPROLIFERATIVE DIABETIC RETINOPATHY OF BOTH EYES WITHOUT MACULAR EDEMA ASSOCIATED WITH TYPE 2 DIABETES MELLITUS: ICD-10-CM

## 2024-06-13 DIAGNOSIS — E11.22 TYPE 2 DIABETES MELLITUS WITH STAGE 3A CHRONIC KIDNEY DISEASE, WITHOUT LONG-TERM CURRENT USE OF INSULIN: Primary | ICD-10-CM

## 2024-06-13 DIAGNOSIS — N18.31 TYPE 2 DIABETES MELLITUS WITH STAGE 3A CHRONIC KIDNEY DISEASE, WITHOUT LONG-TERM CURRENT USE OF INSULIN: Primary | ICD-10-CM

## 2024-06-13 PROCEDURE — 2022F DILAT RTA XM EVC RTNOPTHY: CPT | Mod: HCNC,CPTII,S$GLB, | Performed by: OPTOMETRIST

## 2024-06-13 PROCEDURE — 92004 COMPRE OPH EXAM NEW PT 1/>: CPT | Mod: HCNC,S$GLB,, | Performed by: OPTOMETRIST

## 2024-06-13 PROCEDURE — 99999 PR PBB SHADOW E&M-EST. PATIENT-LVL III: CPT | Mod: PBBFAC,HCNC,, | Performed by: OPTOMETRIST

## 2024-06-13 PROCEDURE — 1159F MED LIST DOCD IN RCRD: CPT | Mod: HCNC,CPTII,S$GLB, | Performed by: OPTOMETRIST

## 2024-06-13 PROCEDURE — 1160F RVW MEDS BY RX/DR IN RCRD: CPT | Mod: HCNC,CPTII,S$GLB, | Performed by: OPTOMETRIST

## 2024-06-13 PROCEDURE — 3288F FALL RISK ASSESSMENT DOCD: CPT | Mod: HCNC,CPTII,S$GLB, | Performed by: OPTOMETRIST

## 2024-06-13 PROCEDURE — 1101F PT FALLS ASSESS-DOCD LE1/YR: CPT | Mod: HCNC,CPTII,S$GLB, | Performed by: OPTOMETRIST

## 2024-06-13 PROCEDURE — 1126F AMNT PAIN NOTED NONE PRSNT: CPT | Mod: HCNC,CPTII,S$GLB, | Performed by: OPTOMETRIST

## 2024-06-13 PROCEDURE — 3052F HG A1C>EQUAL 8.0%<EQUAL 9.0%: CPT | Mod: HCNC,CPTII,S$GLB, | Performed by: OPTOMETRIST

## 2024-06-13 RX ORDER — BLOOD-GLUCOSE METER
EACH MISCELLANEOUS
COMMUNITY
Start: 2024-04-30

## 2024-06-13 NOTE — PROGRESS NOTES
HPI    NP here for DM eye exam. [facility] - 1 yr @ [facility]    Pt. States VA is stable. Currently does not wear glasses and states that   VA is fine the way it is. Does not want glasses. No gtts. Denies F/F/pain.     DM doing better. Working on eating better.  LBSL 92    Hemoglobin A1C       Date                     Value               Ref Range             Status                05/14/2024               8.7 (H)             4.0 - 5.6 %           Final                 04/25/2024               11.5 (H)            4.0 - 5.6 %           Final                 11/16/2023               7.7 (H)             4.0 - 5.6 %           Final                Last edited by Rodri Nichols MA on 6/13/2024  2:54 PM.            Assessment /Plan     For exam results, see Encounter Report.    Type 2 diabetes mellitus with stage 3a chronic kidney disease, without long-term current use of insulin  -     Ambulatory referral/consult to Optometry    Mild nonproliferative diabetic retinopathy of both eyes without macular edema associated with type 2 diabetes mellitus    Ischemic optic neuropathy, right    Nuclear sclerosis of both eyes      1,2. Mild diabetic retinopathy, no csme. Return in 1 year for dilated eye exam. Pigment changes in periph, old laser vs other scarring ou.   3. Looks old, Monitor condition. Patient to report any changes. RTC 1 year recheck.  4. Educated pt on presence of cataracts and effects on vision. No surgery at this time. Recheck in one year.

## 2024-07-26 ENCOUNTER — LAB VISIT (OUTPATIENT)
Dept: PRIMARY CARE CLINIC | Facility: CLINIC | Age: 69
End: 2024-07-26
Payer: MEDICARE

## 2024-07-26 DIAGNOSIS — E11.65 UNCONTROLLED TYPE 2 DIABETES MELLITUS WITH HYPERGLYCEMIA: ICD-10-CM

## 2024-07-26 LAB
ALBUMIN SERPL BCP-MCNC: 3.5 G/DL (ref 3.5–5.2)
ALP SERPL-CCNC: 92 U/L (ref 55–135)
ALT SERPL W/O P-5'-P-CCNC: 16 U/L (ref 10–44)
ANION GAP SERPL CALC-SCNC: 7 MMOL/L (ref 8–16)
AST SERPL-CCNC: 22 U/L (ref 10–40)
BILIRUB SERPL-MCNC: 2.6 MG/DL (ref 0.1–1)
BUN SERPL-MCNC: 16 MG/DL (ref 8–23)
CALCIUM SERPL-MCNC: 8.9 MG/DL (ref 8.7–10.5)
CHLORIDE SERPL-SCNC: 111 MMOL/L (ref 95–110)
CO2 SERPL-SCNC: 23 MMOL/L (ref 23–29)
CREAT SERPL-MCNC: 1.4 MG/DL (ref 0.5–1.4)
EST. GFR  (NO RACE VARIABLE): 55.1 ML/MIN/1.73 M^2
ESTIMATED AVG GLUCOSE: 146 MG/DL (ref 68–131)
GLUCOSE SERPL-MCNC: 141 MG/DL (ref 70–110)
HBA1C MFR BLD: 6.7 % (ref 4–5.6)
POTASSIUM SERPL-SCNC: 4.7 MMOL/L (ref 3.5–5.1)
PROT SERPL-MCNC: 6.9 G/DL (ref 6–8.4)
SODIUM SERPL-SCNC: 141 MMOL/L (ref 136–145)

## 2024-07-26 PROCEDURE — 80053 COMPREHEN METABOLIC PANEL: CPT | Mod: HCNC | Performed by: FAMILY MEDICINE

## 2024-07-26 PROCEDURE — 83036 HEMOGLOBIN GLYCOSYLATED A1C: CPT | Mod: HCNC | Performed by: FAMILY MEDICINE

## 2024-07-31 ENCOUNTER — OFFICE VISIT (OUTPATIENT)
Dept: FAMILY MEDICINE | Facility: CLINIC | Age: 69
End: 2024-07-31
Payer: MEDICARE

## 2024-07-31 VITALS
OXYGEN SATURATION: 98 % | BODY MASS INDEX: 25.21 KG/M2 | DIASTOLIC BLOOD PRESSURE: 70 MMHG | RESPIRATION RATE: 16 BRPM | SYSTOLIC BLOOD PRESSURE: 114 MMHG | TEMPERATURE: 98 F | WEIGHT: 175.69 LBS | HEART RATE: 75 BPM

## 2024-07-31 DIAGNOSIS — N18.31 STAGE 3A CHRONIC KIDNEY DISEASE: ICD-10-CM

## 2024-07-31 DIAGNOSIS — E11.65 TYPE 2 DIABETES MELLITUS WITH HYPERGLYCEMIA, WITHOUT LONG-TERM CURRENT USE OF INSULIN: Primary | ICD-10-CM

## 2024-07-31 PROCEDURE — 1126F AMNT PAIN NOTED NONE PRSNT: CPT | Mod: HCNC,CPTII,S$GLB,

## 2024-07-31 PROCEDURE — 99214 OFFICE O/P EST MOD 30 MIN: CPT | Mod: HCNC,S$GLB,,

## 2024-07-31 PROCEDURE — 3044F HG A1C LEVEL LT 7.0%: CPT | Mod: HCNC,CPTII,S$GLB,

## 2024-07-31 PROCEDURE — 1101F PT FALLS ASSESS-DOCD LE1/YR: CPT | Mod: HCNC,CPTII,S$GLB,

## 2024-07-31 PROCEDURE — 3074F SYST BP LT 130 MM HG: CPT | Mod: HCNC,CPTII,S$GLB,

## 2024-07-31 PROCEDURE — 3078F DIAST BP <80 MM HG: CPT | Mod: HCNC,CPTII,S$GLB,

## 2024-07-31 PROCEDURE — 3008F BODY MASS INDEX DOCD: CPT | Mod: HCNC,CPTII,S$GLB,

## 2024-07-31 PROCEDURE — 99999 PR PBB SHADOW E&M-EST. PATIENT-LVL IV: CPT | Mod: PBBFAC,HCNC,,

## 2024-07-31 PROCEDURE — 1159F MED LIST DOCD IN RCRD: CPT | Mod: HCNC,CPTII,S$GLB,

## 2024-07-31 PROCEDURE — 1160F RVW MEDS BY RX/DR IN RCRD: CPT | Mod: HCNC,CPTII,S$GLB,

## 2024-07-31 PROCEDURE — 3288F FALL RISK ASSESSMENT DOCD: CPT | Mod: HCNC,CPTII,S$GLB,

## 2024-07-31 RX ORDER — BLOOD-GLUCOSE TRANSMITTER
1 EACH MISCELLANEOUS CONTINUOUS
Qty: 1 EACH | Refills: 3 | Status: SHIPPED | OUTPATIENT
Start: 2024-07-31 | End: 2025-07-31

## 2024-07-31 RX ORDER — BLOOD-GLUCOSE SENSOR
3 EACH MISCELLANEOUS
Qty: 3 EACH | Refills: 11 | Status: SHIPPED | OUTPATIENT
Start: 2024-07-31 | End: 2025-07-31

## 2024-07-31 RX ORDER — BLOOD-GLUCOSE,RECEIVER,CONT
1 EACH MISCELLANEOUS CONTINUOUS
Qty: 1 EACH | Refills: 0 | Status: SHIPPED | OUTPATIENT
Start: 2024-07-31 | End: 2025-07-31

## 2024-07-31 NOTE — PROGRESS NOTES
Subjective:       Patient ID: Royer Motley is a 68 y.o. male.    Chief Complaint: 3 month follow up - DM2      Royer Motley is a 68 y.o. male with DM2 who presents to clinic for 3 month follow up. Hemoglobin A1c improved from prior 6.7, previously 8.7. Pt notes he has improved his diet, decreasing sugary foods/ cereals. Reports AM sugars primarily in 160-170 range. Denies hypoglycemia, lowest blood sugar reading of 106. He notes he does not take his Metformin or insulin daily, stating he only takes the medications if his sugar is elevated/ if he eats a sugary meal. He inquires about Dexcom.        Review of Systems   Constitutional:  Negative for fatigue.   Respiratory:  Negative for shortness of breath.    Cardiovascular:  Negative for chest pain.   Neurological:  Negative for dizziness and headaches.         Past Medical History:   Diagnosis Date    Avascular necrosis of hip     Diabetes mellitus, type 2     Enlarged prostate     Sickle cell hemoglobin C disease        Review of patient's allergies indicates:  No Known Allergies      Current Outpatient Medications:     ACCU-CHEK GUIDE GLUCOSE METER Mis, To check BG 3 times daily, to use with insurance preferred meter, Disp: , Rfl:     BD ALCOHOL SWABS PadM, USE TO test blood glucose, Disp: 100 each, Rfl: 3    blood sugar diagnostic Strp, Apply 1 strip topically once daily. Check blood glucose 2 times a day. E11.42, Disp: 100 strip, Rfl: 3    blood sugar diagnostic Strp, To check BG 3 times daily, to use with insurance preferred meter, Disp: 200 strip, Rfl: 11    blood sugar diagnostic Strp, To check BG 3 times daily, to use with insurance preferred meter, Disp: 200 each, Rfl: 11    blood-glucose meter kit, To check BG 3 times daily, to use with insurance preferred meter, Disp: 1 each, Rfl: 0    blood-glucose meter kit, To check BG 3 times daily, to use with insurance preferred meter, Disp: 1 each, Rfl: 0    blood-glucose meter,continuous (DEXCOM G6  ") Misc, 1 Units by Misc.(Non-Drug; Combo Route) route continuous., Disp: 1 each, Rfl: 0    blood-glucose sensor (DEXCOM G6 SENSOR) Sobia, 3 Devices by Misc.(Non-Drug; Combo Route) route every 10 days., Disp: 3 each, Rfl: 11    blood-glucose transmitter (DEXCOM G6 TRANSMITTER) Sobia, 1 Device by Misc.(Non-Drug; Combo Route) route continuous., Disp: 1 each, Rfl: 3    insulin (LANTUS SOLOSTAR U-100 INSULIN) glargine 100 units/mL SubQ pen, Inject 18 Units into the skin every evening., Disp: 1 each, Rfl: 5    lancets Misc, To check BG 3 times daily, to use with insurance preferred meter, Disp: 200 each, Rfl: 11    lancets Misc, To check BG 3 times daily, to use with insurance preferred meter, Disp: 200 each, Rfl: 11    metFORMIN (GLUCOPHAGE-XR) 500 MG ER 24hr tablet, TAKE ONE TABLET BY MOUTH TWICE DAILY WITH MEALS, Disp: 180 tablet, Rfl: 1    morphine (MS CONTIN) 15 MG 12 hr tablet, Take 15 mg by mouth once daily., Disp: , Rfl: 0    multivitamin capsule, Take 1 capsule by mouth once daily., Disp: , Rfl:     oxyCODONE (ROXICODONE) 30 MG Tab, Take 30 mg by mouth 4 (four) times daily as needed., Disp: , Rfl:     pen needle, diabetic 32 gauge x 5/32" Ndle, 1 Application by Misc.(Non-Drug; Combo Route) route Daily., Disp: 100 each, Rfl: 11    pen needle, diabetic 32 gauge x 5/32" Ndle, 1 each by Misc.(Non-Drug; Combo Route) route every evening., Disp: 100 each, Rfl: 5    TRUEPLUS LANCETS 33 gauge Misc, USE WITH LANCET DEVICE TO CHECK BLOOD GLUCOSE TWICE DAILY AS DIRECTED, Disp: 100 each, Rfl: 0    Objective:        Physical Exam  Vitals reviewed.   Constitutional:       General: He is not in acute distress.     Appearance: Normal appearance.   HENT:      Head: Normocephalic and atraumatic.   Eyes:      Conjunctiva/sclera: Conjunctivae normal.   Cardiovascular:      Rate and Rhythm: Normal rate.   Pulmonary:      Effort: Pulmonary effort is normal.   Musculoskeletal:         General: Normal range of motion.      Cervical " back: Normal range of motion and neck supple.   Skin:     General: Skin is warm and dry.   Neurological:      Mental Status: He is alert and oriented to person, place, and time.   Psychiatric:         Behavior: Behavior normal.           Visit Vitals  /70 (BP Location: Right arm, Patient Position: Sitting, BP Method: Medium (Manual))   Pulse 75   Temp 98 °F (36.7 °C) (Oral)   Resp 16   Wt 79.7 kg (175 lb 11.3 oz)   SpO2 98%   BMI 25.21 kg/m²      Assessment:         1. Type 2 diabetes mellitus with hyperglycemia, without long-term current use of insulin    2. Stage 3a chronic kidney disease        Plan:         Diagnoses and all orders for this visit:    Type 2 diabetes mellitus with hyperglycemia, without long-term current use of insulin  -     blood-glucose sensor (DEXCOM G6 SENSOR) Sobia; 3 Devices by Misc.(Non-Drug; Combo Route) route every 10 days.  -     blood-glucose transmitter (DEXCOM G6 TRANSMITTER) Sobia; 1 Device by Misc.(Non-Drug; Combo Route) route continuous.  -     blood-glucose meter,continuous (DEXCOM G6 ) Misc; 1 Units by Misc.(Non-Drug; Combo Route) route continuous.  -     HEMOGLOBIN A1C; Future  -     Recommend he take Metformin daily. Consider decreasing or discontinuing insulin if continued improvement of blood sugar.     Stage 3a chronic kidney disease        -     Stable, continue to monitor.        Follow up as scheduled with A1c prior.        Family Medicine Physician Assistant     Future Appointments       Date Provider Specialty Appt Notes    10/7/2024  Lab .    10/11/2024 Nikko Agarwal MD Family Medicine Annual             All of your core healthy metrics are met.       I spent a total of 25 minutes on the day of the visit.This includes face to face time and non-face to face time preparing to see the patient (eg, review of tests), obtaining and/or reviewing separately obtained history, documenting clinical information in the electronic or other health record,  independently interpreting results and communicating results to the patient/family/caregiver, or care coordinator.

## 2024-10-03 RX ORDER — ISOPROPYL ALCOHOL 0.75 G/1
SWAB TOPICAL
Qty: 100 EACH | Refills: 3 | Status: SHIPPED | OUTPATIENT
Start: 2024-10-03

## 2024-10-03 NOTE — TELEPHONE ENCOUNTER
No care due was identified.  [facility] Embedded Care Due Messages. Reference number: [id number].   10/03/2024 9:27:48 AM CDT

## 2024-10-03 NOTE — TELEPHONE ENCOUNTER
Refill Decision Note   Royer Moltey  is requesting a refill authorization.  Brief Assessment and Rationale for Refill:  Approve     Medication Therapy Plan:         Comments:     Note composed:4:18 PM 10/03/2024

## 2024-11-21 DIAGNOSIS — E11.9 TYPE 2 DIABETES MELLITUS WITHOUT COMPLICATION: ICD-10-CM

## 2025-01-13 DIAGNOSIS — Z00.00 ENCOUNTER FOR MEDICARE ANNUAL WELLNESS EXAM: ICD-10-CM

## 2025-01-15 ENCOUNTER — TELEPHONE (OUTPATIENT)
Dept: FAMILY MEDICINE | Facility: CLINIC | Age: 70
End: 2025-01-15
Payer: MEDICARE

## 2025-01-15 NOTE — TELEPHONE ENCOUNTER
----- Message from Stephanie sent at 1/15/2025  2:47 PM CST -----  Type: Patient call    Who called: Patient Friend (Edward)    Does the patient know what this is regarding? Requesting a call back in regards to needing to schedule an appt ; patient has abdominal pain; pain in left side ; pain comes and goes ; went to ER about 2 weeks ago ; pain is still there ; please advise     Would the patient rather a call back or response via [facility]? Call    Best call back number: [phone] ([place])    Additional information:

## 2025-01-17 ENCOUNTER — TELEPHONE (OUTPATIENT)
Dept: FAMILY MEDICINE | Facility: CLINIC | Age: 70
End: 2025-01-17
Payer: MEDICARE

## 2025-01-17 NOTE — TELEPHONE ENCOUNTER
I spoke with edward via phone. States that todays appt needs to be r/s. Appt r/s to 01/23/2025 at 1430. No further questions. Date, time and location confirmed.     ----- Message from Ana sent at 1/17/2025  8:59 AM CST -----  Contact: edward w/friend  Pt has an appt today at 3:30 PM but due to transportation issues needs to [place], Please call Edward back at [phone] and thanks

## 2025-01-28 ENCOUNTER — HOSPITAL ENCOUNTER (OUTPATIENT)
Dept: RADIOLOGY | Facility: CLINIC | Age: 70
Discharge: HOME | End: 2025-01-28
Payer: MEDICARE

## 2025-01-28 ENCOUNTER — OFFICE VISIT (OUTPATIENT)
Dept: FAMILY MEDICINE | Facility: CLINIC | Age: 70
End: 2025-01-28
Payer: MEDICARE

## 2025-01-28 VITALS
HEART RATE: 85 BPM | BODY MASS INDEX: 24.34 KG/M2 | HEIGHT: 70 IN | SYSTOLIC BLOOD PRESSURE: 118 MMHG | WEIGHT: 170 LBS | OXYGEN SATURATION: 99 % | TEMPERATURE: 98 F | DIASTOLIC BLOOD PRESSURE: 64 MMHG

## 2025-01-28 DIAGNOSIS — F11.20 OPIOID DEPENDENCE, UNCOMPLICATED: ICD-10-CM

## 2025-01-28 DIAGNOSIS — M25.552 LEFT HIP PAIN: Primary | ICD-10-CM

## 2025-01-28 DIAGNOSIS — M54.50 PAIN IN LEFT LUMBAR REGION OF BACK: ICD-10-CM

## 2025-01-28 DIAGNOSIS — G62.9 NEUROPATHY: ICD-10-CM

## 2025-01-28 DIAGNOSIS — E11.22 TYPE 2 DIABETES MELLITUS WITH STAGE 3A CHRONIC KIDNEY DISEASE, WITHOUT LONG-TERM CURRENT USE OF INSULIN: ICD-10-CM

## 2025-01-28 DIAGNOSIS — R63.4 WEIGHT LOSS: ICD-10-CM

## 2025-01-28 DIAGNOSIS — N18.31 TYPE 2 DIABETES MELLITUS WITH STAGE 3A CHRONIC KIDNEY DISEASE, WITHOUT LONG-TERM CURRENT USE OF INSULIN: ICD-10-CM

## 2025-01-28 DIAGNOSIS — M25.552 LEFT HIP PAIN: ICD-10-CM

## 2025-01-28 LAB
BILIRUBIN, UA POC OHS: NEGATIVE
BLOOD, UA POC OHS: NEGATIVE
CLARITY, UA POC OHS: CLEAR
COLOR, UA POC OHS: YELLOW
GLUCOSE, UA POC OHS: 500
KETONES, UA POC OHS: NEGATIVE
LEUKOCYTES, UA POC OHS: NEGATIVE
NITRITE, UA POC OHS: NEGATIVE
PH, UA POC OHS: 5.5
PROTEIN, UA POC OHS: NEGATIVE
SPECIFIC GRAVITY, UA POC OHS: <=1.005
UROBILINOGEN, UA POC OHS: 0.2

## 2025-01-28 PROCEDURE — G2211 COMPLEX E/M VISIT ADD ON: HCPCS | Mod: HCNC,S$GLB,,

## 2025-01-28 PROCEDURE — 99214 OFFICE O/P EST MOD 30 MIN: CPT | Mod: HCNC,S$GLB,,

## 2025-01-28 PROCEDURE — 81003 URINALYSIS AUTO W/O SCOPE: CPT | Mod: QW,HCNC,S$GLB,

## 2025-01-28 PROCEDURE — 1160F RVW MEDS BY RX/DR IN RCRD: CPT | Mod: HCNC,CPTII,S$GLB,

## 2025-01-28 PROCEDURE — 72100 X-RAY EXAM L-S SPINE 2/3 VWS: CPT | Mod: 26,HCNC,, | Performed by: RADIOLOGY

## 2025-01-28 PROCEDURE — 1159F MED LIST DOCD IN RCRD: CPT | Mod: HCNC,CPTII,S$GLB,

## 2025-01-28 PROCEDURE — 3074F SYST BP LT 130 MM HG: CPT | Mod: HCNC,CPTII,S$GLB,

## 2025-01-28 PROCEDURE — 87086 URINE CULTURE/COLONY COUNT: CPT | Mod: HCNC

## 2025-01-28 PROCEDURE — 3078F DIAST BP <80 MM HG: CPT | Mod: HCNC,CPTII,S$GLB,

## 2025-01-28 PROCEDURE — 72100 X-RAY EXAM L-S SPINE 2/3 VWS: CPT | Mod: TC,HCNC,FY,PO

## 2025-01-28 PROCEDURE — 73502 X-RAY EXAM HIP UNI 2-3 VIEWS: CPT | Mod: 26,HCNC,LT, | Performed by: STUDENT IN AN ORGANIZED HEALTH CARE EDUCATION/TRAINING PROGRAM

## 2025-01-28 PROCEDURE — 73502 X-RAY EXAM HIP UNI 2-3 VIEWS: CPT | Mod: TC,HCNC,FY,PO,LT

## 2025-01-28 PROCEDURE — 1125F AMNT PAIN NOTED PAIN PRSNT: CPT | Mod: HCNC,CPTII,S$GLB,

## 2025-01-28 PROCEDURE — 99999 PR PBB SHADOW E&M-EST. PATIENT-LVL IV: CPT | Mod: PBBFAC,HCNC,,

## 2025-01-28 PROCEDURE — 3072F LOW RISK FOR RETINOPATHY: CPT | Mod: HCNC,CPTII,S$GLB,

## 2025-01-28 PROCEDURE — 3008F BODY MASS INDEX DOCD: CPT | Mod: HCNC,CPTII,S$GLB,

## 2025-01-28 RX ORDER — METHOCARBAMOL 500 MG/1
500 TABLET, FILM COATED ORAL 4 TIMES DAILY
Qty: 40 TABLET | Refills: 0 | Status: SHIPPED | OUTPATIENT
Start: 2025-01-28 | End: 2025-02-07

## 2025-01-28 RX ORDER — METHOCARBAMOL 500 MG/1
500 TABLET, FILM COATED ORAL 4 TIMES DAILY
Qty: 40 TABLET | Refills: 0 | Status: SHIPPED | OUTPATIENT
Start: 2025-01-28 | End: 2025-01-28

## 2025-01-28 NOTE — PROGRESS NOTES
Subjective:       Patient ID: Royer Motley is a 69 y.o. male.    Chief Complaint: left flank pain      Royer Motley is a 69 y.o. male with BPH, DM2, sickle cell, CKD who presents to clinic for evaluation of intermittent left flank pain x 1 month. He was seen in the ED for this in December. CT abd pelvis with findings consistent with possible cystitis. Pain occurs constantly, but waxes and wanes in intensity. Pain radiated across his stomach today. He states pain occurs with movement. He reports recent unintentional weight loss, which he attributes to his diabetes medications. He denies fever, chills, shortness of breath, chest pain, blood in the stool, constipation, diarrhea, n/v, dysuria, urinary urgency, frequency. Denies any recent injuries. Denies sickle cell crisis.         Review of Systems   Constitutional:  Positive for unexpected weight change. Negative for appetite change, chills, diaphoresis and fever.   Respiratory:  Negative for shortness of breath.    Cardiovascular:  Negative for chest pain.   Gastrointestinal:  Positive for abdominal pain. Negative for blood in stool, constipation, diarrhea, nausea and vomiting.   Genitourinary:  Positive for flank pain. Negative for decreased urine volume, difficulty urinating, dysuria, frequency and hematuria.   Skin:  Negative for rash.         Past Medical History:   Diagnosis Date    Avascular necrosis of hip     Diabetes mellitus, type 2     Enlarged prostate     Sickle cell hemoglobin C disease        Review of patient's allergies indicates:  No Known Allergies      Current Outpatient Medications:     ACCU-CHEK GUIDE GLUCOSE METER Misc, To check BG 3 times daily, to use with insurance preferred meter, Disp: , Rfl:     ALCOHOL PREP PAD SPACER, USE TO test blood glucose, Disp: 100 each, Rfl: 3    blood sugar diagnostic Strp, Apply 1 strip topically once daily. Check blood glucose 2 times a day. E11.42, Disp: 100 strip, Rfl: 3    blood sugar diagnostic  "Strp, To check BG 3 times daily, to use with insurance preferred meter, Disp: 200 strip, Rfl: 11    blood sugar diagnostic Strp, To check BG 3 times daily, to use with insurance preferred meter, Disp: 200 each, Rfl: 11    blood-glucose meter kit, To check BG 3 times daily, to use with insurance preferred meter, Disp: 1 each, Rfl: 0    blood-glucose meter kit, To check BG 3 times daily, to use with insurance preferred meter, Disp: 1 each, Rfl: 0    blood-glucose meter,continuous (DEXCOM G6 ) Misc, 1 Units by Misc.(Non-Drug; Combo Route) route continuous., Disp: 1 each, Rfl: 0    blood-glucose sensor (DEXCOM G6 SENSOR) Sobia, 3 Devices by Misc.(Non-Drug; Combo Route) route every 10 days., Disp: 3 each, Rfl: 11    blood-glucose transmitter (DEXCOM G6 TRANSMITTER) Sobia, 1 Device by Misc.(Non-Drug; Combo Route) route continuous., Disp: 1 each, Rfl: 3    insulin (LANTUS SOLOSTAR U-100 INSULIN) glargine 100 units/mL SubQ pen, Inject 18 Units into the skin every evening., Disp: 1 each, Rfl: 5    lancets Misc, To check BG 3 times daily, to use with insurance preferred meter, Disp: 200 each, Rfl: 11    lancets Misc, To check BG 3 times daily, to use with insurance preferred meter, Disp: 200 each, Rfl: 11    metFORMIN (GLUCOPHAGE-XR) 500 MG ER 24hr tablet, TAKE ONE TABLET BY MOUTH TWICE DAILY WITH MEALS, Disp: 180 tablet, Rfl: 1    morphine (MS CONTIN) 15 MG 12 hr tablet, Take 15 mg by mouth once daily., Disp: , Rfl: 0    multivitamin capsule, Take 1 capsule by mouth once daily., Disp: , Rfl:     oxyCODONE (ROXICODONE) 30 MG Tab, Take 30 mg by mouth 4 (four) times daily as needed., Disp: , Rfl:     pen needle, diabetic 32 gauge x 5/32" Ndle, 1 each by Misc.(Non-Drug; Combo Route) route every evening., Disp: 100 each, Rfl: 5    TRUEPLUS LANCETS 33 gauge Misc, USE WITH LANCET DEVICE TO CHECK BLOOD GLUCOSE TWICE DAILY AS DIRECTED, Disp: 100 each, Rfl: 0    methocarbamoL (ROBAXIN) 500 MG Tab, Take 1 tablet (500 mg total) by " "mouth 4 (four) times daily. Do NOT take with opioid pain medication. for 10 days, Disp: 40 tablet, Rfl: 0    Objective:        Physical Exam  Vitals reviewed.   Constitutional:       General: He is not in acute distress.     Appearance: Normal appearance.   HENT:      Head: Normocephalic and atraumatic.   Eyes:      Conjunctiva/sclera: Conjunctivae normal.   Cardiovascular:      Rate and Rhythm: Normal rate and regular rhythm.      Heart sounds: Normal heart sounds.   Pulmonary:      Effort: Pulmonary effort is normal.      Breath sounds: Normal breath sounds.   Abdominal:      General: Bowel sounds are normal.      Tenderness: There is no abdominal tenderness. There is no right CVA tenderness, left CVA tenderness, guarding or rebound.   Musculoskeletal:         General: Normal range of motion.      Cervical back: Normal range of motion.        Back:         Legs:    Skin:     General: Skin is warm and dry.      Findings: No rash.   Neurological:      Mental Status: He is alert and oriented to person, place, and time.   Psychiatric:         Behavior: Behavior normal.           Visit Vitals  /64 (BP Location: Right arm, Patient Position: Sitting)   Pulse 85   Temp 97.6 °F (36.4 °C) (Oral)   Ht 5' 10" (1.778 m)   Wt 77.1 kg (169 lb 15.6 oz)   SpO2 99%   BMI 24.39 kg/m²      Assessment:         1. Left hip pain    2. Pain in left lumbar region of back    3. Weight loss    4. Neuropathy    5. Type 2 diabetes mellitus with stage 3a chronic kidney disease, without long-term current use of insulin    6. Opioid dependence, uncomplicated        Plan:         Diagnoses and all orders for this visit:    Left hip pain  -     POCT Urinalysis(Instrument)  -     Urine Culture High Risk  -     X-Ray Hip 2 or 3 views Left with Pelvis when performed; Future  -     X-Ray Lumbar Spine 2 Or 3 Views; Future  -     Pt has past medical history of avascular necrosis of the hip. Will get xray to evaluate.     Pain in left lumbar region " of back  -     methocarbamoL (ROBAXIN) 500 MG Tab; Take 1 tablet (500 mg total) by mouth 4 (four) times daily. Do NOT take with opioid pain medication. for 10 days. Discussed may take drowsiness. Do not take and drive.    Weight loss         -   Pt denies decreased appetite, night sweats. Suspect to be related to noncompliance with diabetic medications. Recommend he take medications as prescribed.    Neuropathy         -    Recommend good glycemic control to prevent worsening.    Type 2 diabetes mellitus with stage 3a chronic kidney disease, without long-term current use of insulin          -    Pt reports he is non-compliant with his diabetic medications.    Opioid dependence, uncomplicated          -   Pt on Morphine as needed for pain. Pt advised do not take opioid medication with muscle relaxer. Pt demonstrates understanding.        Imaging will be reviewed, and further recommendations will be made based on results.         Family Medicine Physician Assistant     Future Appointments       Date Specialty Appt Notes    1/28/2025 Radiology Left hip pain    1/28/2025 Radiology Left hip pain             All of your core healthy metrics are met.       I spent a total of 30 minutes on the day of the visit.This includes face to face time and non-face to face time preparing to see the patient (eg, review of tests), obtaining and/or reviewing separately obtained history, documenting clinical information in the electronic or other health record, independently interpreting results and communicating results to the patient/family/caregiver, or care coordinator.

## 2025-01-29 LAB — BACTERIA UR CULT: NORMAL

## 2025-01-30 ENCOUNTER — TELEPHONE (OUTPATIENT)
Dept: FAMILY MEDICINE | Facility: CLINIC | Age: 70
End: 2025-01-30
Payer: MEDICARE

## 2025-01-30 DIAGNOSIS — M87.052 AVASCULAR NECROSIS OF BONE OF LEFT HIP: Primary | ICD-10-CM

## 2025-01-30 NOTE — TELEPHONE ENCOUNTER
----- Message from Bret Maravilla sent at 1/29/2025 11:38 AM CST -----  Spoke to pt via phone pt would like to see Orthopedics   Please advise

## 2025-01-30 NOTE — TELEPHONE ENCOUNTER
Call placed to patient for notification orthopedic referral has been placed.  Writer was unable to schedule, provided the number to the orthopedic scheduling line for further assistance.

## 2025-01-31 ENCOUNTER — TELEPHONE (OUTPATIENT)
Dept: ORTHOPEDICS | Facility: CLINIC | Age: 70
End: 2025-01-31
Payer: MEDICARE

## 2025-01-31 NOTE — TELEPHONE ENCOUNTER
----- Message from Willis sent at 1/31/2025 12:21 PM CST -----  Patient would like a callback regarding scheduling an appointment.--     NP-- Out of template    Avascular necrosis of bone of left hip    [phone]

## 2025-02-07 ENCOUNTER — TELEPHONE (OUTPATIENT)
Dept: ORTHOPEDICS | Facility: CLINIC | Age: 70
End: 2025-02-07
Payer: MEDICARE

## 2025-02-17 ENCOUNTER — OFFICE VISIT (OUTPATIENT)
Dept: ORTHOPEDICS | Facility: CLINIC | Age: 70
End: 2025-02-17
Payer: MEDICARE

## 2025-02-17 ENCOUNTER — TELEPHONE (OUTPATIENT)
Dept: FAMILY MEDICINE | Facility: CLINIC | Age: 70
End: 2025-02-17
Payer: MEDICARE

## 2025-02-17 DIAGNOSIS — E11.65 TYPE 2 DIABETES MELLITUS WITH HYPERGLYCEMIA, WITHOUT LONG-TERM CURRENT USE OF INSULIN: ICD-10-CM

## 2025-02-17 DIAGNOSIS — M87.052 AVASCULAR NECROSIS OF BONE OF HIP, LEFT: Primary | ICD-10-CM

## 2025-02-17 NOTE — PROGRESS NOTES
69 years old has had pain in the left abdominal region for about a month sharp pain feels that it is moving in his abdomen.  Pain starts in the left ilium extends across to the umbilicus.  Pain is up to 8 on the pain scale.  Oral medications provide some relief     Exam shows full and painless motion of the hip no signs infection, somewhat tender in the abdomen well-healed surgical scars from prostate surgery in 2023    X-rays of the hip show bilateral changes in the femoral heads possibly AVN with no collapse     Assessment:  Left sided abdominal pain    Plan: Recommend follow up primary care, x-ray findings of his hip do not seem to correlate with the symptoms

## 2025-02-17 NOTE — TELEPHONE ENCOUNTER
No care due was identified.  [facility] Embedded Care Due Messages. Reference number: [id number].   2/17/2025 9:27:20 AM CST

## 2025-02-17 NOTE — TELEPHONE ENCOUNTER
Patient/patient's spouse (Edward) states patient performed office visit with BRENDA Jackson on 1/28/25 related to pain to left hip/abdomen area.  States patient was referred to orthopedics and performed consult with orthopedics today.  Patient was advised there was no findings related to hip.  Patient's spouse is requesting further direction.  Requesting to know if gasto referral can be placed or if patient will need to schedule another appointment with BRENDA Jackson.  Please advise. Thank you.

## 2025-02-17 NOTE — TELEPHONE ENCOUNTER
Tammy Brown Staff     ----- Message from Tammy sent at 2/17/2025  9:56 AM CST -----  Contact: self  Type:  Needs Medical AdviceWho Called: ptSymptoms (please be specific):  How long has patient had these symptoms:  Pharmacy name and phone #:  Would the patient rather a call back or a response via [facility]? Call Best Call Back Number: [phone] Information: pt states she went to ortho appt and they was no finding on the hip needs to see gasto.   Please call back to advise. Thanks!  ----- Message -----  From: Tammy Brown  Sent: 2/17/2025   9:58 AM CST  To: Manuel Gomez Staff    Type:  Needs Medical AdviceWho Called: ptSymptoms (please be specific):  How long has patient had these symptoms:  Pharmacy name and phone #:  Would the patient rather a call back or a response via [facility]? Call [facility] Call Back Number: [phone] Information: pt states she went to ortho appt and they was no finding on the hip needs to see gasto.   Please call back to advise. Thanks!

## 2025-02-17 NOTE — TELEPHONE ENCOUNTER
Please schedule him a new apt to discuss his pain. It looks like orthopedics did not find his pain to be related to the hip.

## 2025-03-13 ENCOUNTER — TELEPHONE (OUTPATIENT)
Dept: FAMILY MEDICINE | Facility: CLINIC | Age: 70
End: 2025-03-13
Payer: MEDICARE

## 2025-03-13 NOTE — TELEPHONE ENCOUNTER
LOV--1-28-25 with BRENDA Jackson related to left hip pain.  Xray performed, referral to orthopedics placed.  Patient consulted with orthopedics.  It was determined patient's pain was not orthopedic related.  Patient continues to have pain to left side that start above hip and that radiate to front abdominal area mid way the abdomen.  Patient previously had prostate surgery and has surgical scars in this abdominal area.  Patient's spouse (Edward) is requesting to know next steps as orthopedics advised to reach out to family medicine as symptoms do not seem to correlate with xray findings.  Requesting to know if patient should see GI or if other recommendations can be given.  Please advise. Thank you.

## 2025-03-13 NOTE — TELEPHONE ENCOUNTER
Lynette Winn [place] Staff     ----- Message from Lynette sent at 3/13/2025  3:27 PM CDT -----  Name of Who is Calling:JOHN TORRES [[id number]] (Hamida)    What is the request in detail:Hamida requesting a call back today if possible regarding pt.    Can the clinic reply by [facility]:Call    What Number to Call Back if not in [facility]:  Telephone Information:[place]  [phone] Hamida [phone]

## 2025-03-17 ENCOUNTER — HOSPITAL ENCOUNTER (OUTPATIENT)
Dept: RADIOLOGY | Facility: CLINIC | Age: 70
Discharge: HOME | End: 2025-03-17
Payer: MEDICARE

## 2025-03-17 ENCOUNTER — OFFICE VISIT (OUTPATIENT)
Dept: FAMILY MEDICINE | Facility: CLINIC | Age: 70
End: 2025-03-17
Payer: MEDICARE

## 2025-03-17 VITALS
BODY MASS INDEX: 24.14 KG/M2 | WEIGHT: 168.63 LBS | OXYGEN SATURATION: 99 % | SYSTOLIC BLOOD PRESSURE: 110 MMHG | HEIGHT: 70 IN | DIASTOLIC BLOOD PRESSURE: 62 MMHG | HEART RATE: 77 BPM | TEMPERATURE: 99 F

## 2025-03-17 DIAGNOSIS — F11.20 OPIOID DEPENDENCE, UNCOMPLICATED: ICD-10-CM

## 2025-03-17 DIAGNOSIS — R10.32 LEFT LOWER QUADRANT ABDOMINAL PAIN: Primary | ICD-10-CM

## 2025-03-17 DIAGNOSIS — R10.32 LEFT LOWER QUADRANT ABDOMINAL PAIN: ICD-10-CM

## 2025-03-17 PROCEDURE — 99999 PR PBB SHADOW E&M-EST. PATIENT-LVL IV: CPT | Mod: PBBFAC,HCNC,,

## 2025-03-17 PROCEDURE — 74018 RADEX ABDOMEN 1 VIEW: CPT | Mod: TC,HCNC,FY,PO

## 2025-03-17 PROCEDURE — 74018 RADEX ABDOMEN 1 VIEW: CPT | Mod: 26,HCNC,, | Performed by: RADIOLOGY

## 2025-03-17 NOTE — PROGRESS NOTES
Subjective:       Patient ID: Royer Motley is a 69 y.o. male.    Chief Complaint: left abdominal pain       Royer Motley is a 69 y.o. male with BPH, DM2, sickle cell, CKD who presents to clinic for evaluation of continued left lower abdominal pain x 3 months. The pain has been intermittent since onset. He describes the pain as sore and states it occasionally radiates to the right side. Denies constipation, diarrhea, nausea, vomiting, fever, or chills. Denies any urinary complaints (dysuria, urgency, or any further flank pain). States he does get up at night 1-2x to urinate. He reports he takes pain medication 4 times daily.    Seen in the ED for same complaint in December. CT abd/ pelvis c/w possible cystitis. I saw him for same concern in January. Urinalysis and culture negative for signs of infection. Xray of the hips and l-spine had findings consistent with avascular necrosis seen on imaging of his hip, which was seen on prior CT imaging. I referred him to orthopedics who did not feel that his pain was related to the hip.         Review of Systems   Constitutional:  Negative for appetite change, chills, fatigue and fever.   Respiratory:  Negative for shortness of breath.    Cardiovascular:  Negative for chest pain.   Gastrointestinal:  Positive for abdominal pain. Negative for constipation, diarrhea, nausea and vomiting.   Genitourinary:  Negative for difficulty urinating, dysuria, flank pain, frequency, hematuria, scrotal swelling, testicular pain and urgency.   Skin:  Negative for rash.   Neurological:  Negative for dizziness and headaches.         Past Medical History:   Diagnosis Date    Avascular necrosis of hip     Diabetes mellitus, type 2     Enlarged prostate     Sickle cell hemoglobin C disease        Review of patient's allergies indicates:  No Known Allergies    Current Medications[1]    Objective:        Physical Exam  Vitals reviewed.   Constitutional:       General: He is not in acute  "distress.     Appearance: Normal appearance.   HENT:      Head: Normocephalic and atraumatic.   Eyes:      Conjunctiva/sclera: Conjunctivae normal.   Cardiovascular:      Rate and Rhythm: Normal rate and regular rhythm.      Heart sounds: Normal heart sounds.   Pulmonary:      Effort: Pulmonary effort is normal.      Breath sounds: Normal breath sounds.   Abdominal:      Palpations: There is no mass.      Tenderness: There is abdominal tenderness in the left upper quadrant and left lower quadrant. There is no right CVA tenderness, left CVA tenderness, guarding or rebound.      Hernia: No hernia is present.   Musculoskeletal:         General: Normal range of motion.      Cervical back: Normal range of motion.   Skin:     General: Skin is warm and dry.   Neurological:      Mental Status: He is alert and oriented to person, place, and time.   Psychiatric:         Behavior: Behavior normal.           Visit Vitals  /62 (BP Location: Right arm, Patient Position: Sitting)   Pulse 77   Temp 98.6 °F (37 °C) (Oral)   Ht 5' 10" (1.778 m)   Wt 76.5 kg (168 lb 10.4 oz)   SpO2 99%   BMI 24.20 kg/m²      Assessment:         1. Left lower quadrant abdominal pain    2. Opioid dependence, uncomplicated        Plan:         Diagnoses and all orders for this visit:    Left lower quadrant abdominal pain  -     Urinalysis, Reflex to Urine Culture Urine, Clean Catch; Future  -     CBC Auto Differential; Future  -     Comprehensive Metabolic Panel; Future  -     LIPASE; Future  -     C-Reactive Protein; Future  -     X-Ray Abdomen AP 1 View; Future    Opioid dependence, uncomplicated  -     X-Ray Abdomen AP 1 View; Future     Labs/ imaging will be reviewed, and further recommendations will be made based on results.     Consider repeating CT abd/ pelvis and/ or referral to gastro if tests unrevealing.  Discussed necessity to hold Metformin for 48 hours following CT if needed.        Family Medicine Physician Assistant     Future " Appointments       Date Specialty Appt Notes    3/17/2025 Lab .               All of your core healthy metrics are met.       I spent a total of 30 minutes on the day of the visit.This includes face to face time and non-face to face time preparing to see the patient (eg, review of tests), obtaining and/or reviewing separately obtained history, documenting clinical information in the electronic or other health record, independently interpreting results and communicating results to the patient/family/caregiver, or care coordinator.         [1]   Current Outpatient Medications:     ACCU-CHEK GUIDE GLUCOSE METER Misc, To check BG 3 times daily, to use with insurance preferred meter, Disp: , Rfl:     ALCOHOL PREP PAD SPACER, USE TO test blood glucose, Disp: 100 each, Rfl: 3    blood sugar diagnostic Strp, Apply 1 strip topically once daily. Check blood glucose 2 times a day. E11.42, Disp: 100 strip, Rfl: 3    blood sugar diagnostic Strp, To check BG 3 times daily, to use with insurance preferred meter, Disp: 200 strip, Rfl: 11    blood sugar diagnostic Strp, To check BG 3 times daily, to use with insurance preferred meter, Disp: 200 each, Rfl: 11    blood-glucose meter kit, To check BG 3 times daily, to use with insurance preferred meter, Disp: 1 each, Rfl: 0    blood-glucose meter kit, To check BG 3 times daily, to use with insurance preferred meter, Disp: 1 each, Rfl: 0    blood-glucose meter,continuous (DEXCOM G6 ) Misc, 1 Units by Misc.(Non-Drug; Combo Route) route continuous., Disp: 1 each, Rfl: 0    blood-glucose sensor (DEXCOM G6 SENSOR) [place], 3 Devices by Misc.(Non-Drug; Combo Route) route every 10 days., Disp: 3 each, Rfl: 11    blood-glucose transmitter (DEXCOM G6 TRANSMITTER) [place], 1 Device by Misc.(Non-Drug; Combo Route) route continuous., Disp: 1 each, Rfl: 3    insulin (LANTUS SOLOSTAR U-100 INSULIN) glargine 100 units/mL SubQ pen, Inject 18 Units into the skin every evening., Disp: 1 each, Rfl: 5    " lancets Misc, To check BG 3 times daily, to use with insurance preferred meter, Disp: 200 each, Rfl: 11    lancets Misc, To check BG 3 times daily, to use with insurance preferred meter, Disp: 200 each, Rfl: 11    metFORMIN (GLUCOPHAGE-XR) 500 MG ER 24hr tablet, TAKE ONE TABLET BY MOUTH TWICE DAILY WITH MEALS, Disp: 180 tablet, Rfl: 1    morphine (MS CONTIN) 15 MG 12 hr tablet, Take 15 mg by mouth once daily., Disp: , Rfl: 0    multivitamin capsule, Take 1 capsule by mouth once daily., Disp: , Rfl:     oxyCODONE (ROXICODONE) 30 MG Tab, Take 30 mg by mouth 4 (four) times daily as needed., Disp: , Rfl:     pen needle, diabetic 32 gauge x 5/32" Ndle, 1 each by Misc.(Non-Drug; Combo Route) route every evening., Disp: 100 each, Rfl: 5    TRUEPLUS LANCETS 33 gauge Misc, USE WITH LANCET DEVICE TO CHECK BLOOD GLUCOSE TWICE DAILY AS DIRECTED, Disp: 100 each, Rfl: 0    "

## 2025-03-18 ENCOUNTER — RESULTS FOLLOW-UP (OUTPATIENT)
Dept: FAMILY MEDICINE | Facility: CLINIC | Age: 70
End: 2025-03-18

## 2025-03-18 DIAGNOSIS — R93.89 ABNORMAL X-RAY: Primary | ICD-10-CM

## 2025-03-19 NOTE — PROGRESS NOTES
Spoke to pt via phone, pt verbalized understanding. Pt had no further questions at this time.  Pt states he will call us back to scheduled

## 2025-03-29 ENCOUNTER — HOSPITAL ENCOUNTER (OUTPATIENT)
Dept: RADIOLOGY | Facility: HOSPITAL | Age: 70
Discharge: HOME | End: 2025-03-29
Payer: MEDICARE

## 2025-03-29 DIAGNOSIS — R93.89 ABNORMAL X-RAY: ICD-10-CM

## 2025-03-29 PROCEDURE — 71046 X-RAY EXAM CHEST 2 VIEWS: CPT | Mod: TC,HCNC,FY,PO

## 2025-03-29 PROCEDURE — 71046 X-RAY EXAM CHEST 2 VIEWS: CPT | Mod: 26,HCNC,, | Performed by: RADIOLOGY

## 2025-03-31 ENCOUNTER — RESULTS FOLLOW-UP (OUTPATIENT)
Dept: FAMILY MEDICINE | Facility: CLINIC | Age: 70
End: 2025-03-31

## 2025-04-26 ENCOUNTER — PATIENT MESSAGE (OUTPATIENT)
Dept: ADMINISTRATIVE | Facility: HOSPITAL | Age: 70
End: 2025-04-26
Payer: MEDICARE

## 2025-04-29 ENCOUNTER — PATIENT OUTREACH (OUTPATIENT)
Dept: ADMINISTRATIVE | Facility: HOSPITAL | Age: 70
End: 2025-04-29
Payer: MEDICARE

## 2025-04-29 NOTE — PROGRESS NOTES
The patient is showing as non-compliant on the Statin Therapy Measure. Patient does not have current statin listed in medication list.       MSG TO PCP TO SEE IF PATIENT SHOULD BE ON A STATIN LAST OFFICE VISIT 3/17/2025  NO UPCOMING OFFICE VISIT    Statin Use in Persons with Diabetes -Patients aged 40-75 years with a diagnosis of diabetes: Type 1 or Type 2.  Needs any Intensity Statin during the calendar year      Moderate-Intensity Statin Therapy  Atorvastatin 10-20 mg  Pitavastatin 1-4 mg  Simvastatin 20-40 mg  Rosuvastatin 5-10 mg  Pravastatin 40-80 mg  Lovastatin 40 mg  Fluvastatin 40 mg  Fluvastatin XL 80 mg    High-intensity Statin Therapy  Atorvastatin 40-80 mg  Rosuvastatin 20-40 mg  Simvastatin 80 mg    Exclusions: Intolerance needs a 2025 code (myalgia, myositis, myopathy, or rhabdomyolysis) during the current measurement year AND problem list updated and reviewed IF APPLIES.   G72.0  Drug-induced myopathy or rhabdomyolysis   G72.9  Myopathy, unspecified  M60.9  Myositis, unspecified  M79.10 Myalgia, unspecified site

## 2025-04-30 DIAGNOSIS — E11.9 TYPE 2 DIABETES MELLITUS WITHOUT COMPLICATION: ICD-10-CM

## 2025-07-02 DIAGNOSIS — E11.65 TYPE 2 DIABETES MELLITUS WITH HYPERGLYCEMIA, WITHOUT LONG-TERM CURRENT USE OF INSULIN: ICD-10-CM

## 2025-07-02 RX ORDER — INSULIN GLARGINE 100 [IU]/ML
18 INJECTION, SOLUTION SUBCUTANEOUS NIGHTLY
Qty: 15 ML | Refills: 5 | Status: SHIPPED | OUTPATIENT
Start: 2025-07-02 | End: 2026-07-02

## 2025-07-02 NOTE — TELEPHONE ENCOUNTER
Care Due:                  Date            Visit Type   Department     Provider  --------------------------------------------------------------------------------                                [facility]     [facility]  Last Visit: 04-      FOLLOW UP    MEDICINE       Nikko Agarwal  Next Visit: None Scheduled  None         None Found                                                            Last  Test          Frequency    Reason                     Performed    Due Date  --------------------------------------------------------------------------------    Office Visit  15 months..  insulin, metFORMIN.......  04- 07-    HBA1C.......  6 months...  insulin, metFORMIN.......  07- 01-    [employer] Embedded Care Due Messages. Reference number: [id number].   7/02/2025 9:30:10 AM CDT

## 2025-07-02 NOTE — TELEPHONE ENCOUNTER
Lov 3/17/25  Nov none   Latrell Sims  Medical Oncology  [address] [place] [address], [place] [address]  Phone: [phone]  Fax: [phone]  Established Patient  Follow Up Time:

## 2025-08-06 ENCOUNTER — PATIENT OUTREACH (OUTPATIENT)
Dept: ADMINISTRATIVE | Facility: HOSPITAL | Age: 70
End: 2025-08-06
Payer: MEDICARE

## 2025-08-06 DIAGNOSIS — E11.9 DIABETES MELLITUS WITHOUT COMPLICATION: ICD-10-CM

## 2025-08-06 DIAGNOSIS — Z12.11 SCREENING FOR COLORECTAL CANCER: ICD-10-CM

## 2025-08-06 DIAGNOSIS — Z12.12 SCREENING FOR COLORECTAL CANCER: ICD-10-CM

## 2025-08-06 DIAGNOSIS — Z71.89 COMPLEX CARE COORDINATION: Primary | ICD-10-CM

## 2025-08-06 NOTE — LETTER
August 6, 2025    Royer Motley  [id number] [place] [address] - Care Coordination  [address]  [facility] [address]  Phone: [phone]        Dear Royer,  It was a pleasure speaking to you Wednesday. Thank you for taking my call. Please don't hesitate to reach out to us here in Primary Care if we can help you with anything. You can reach us via your patient portal or by calling [phone].     As a follow up to our call - [facility] is committed to your overall health. Periodically we review the health information in your chart to make sure you are up to date on all of your recommended tests and/or procedures.       Our review of your chart shows that you are due for:    VBHM Score: 6     Colon Cancer Screening  Urine Screening  Eye Exam  Hemoglobin A1c  Lipid Panel  Foot Exam  Primary Care Provider Appointment with Dr. CRUZITO Agarwal  Diabetes Education Appointment    Pneumonia Vaccine  RSV Vaccine        In addition, please remember to begin using your [facility] patient portal. You may download the claudia on your smart phone.    Sincerely,  Alee, Care Coordinator  Nikko Agarwal MD and your [facility] Primary Care Team   [phone] (phone)  [phone] (fax)

## 2025-08-06 NOTE — PROGRESS NOTES
Care Coordination Encounter Details:       MyChart Portal Status:         [x]  Reviewed MyChart Portal Status offered / enrolled if applicable        Additional Notes:     MyChart Outcomes: Encouraged patient to begin using his patient portal         Updates Requested / Reviewed:        Updated Care Coordination Note, Care Everywhere, , and Immunizations Reconciliation Completed or Queried: [place]         Health Maintenance Screening(s) Due:      Health Maintenance Topics Overdue:      VBHM Score: 6     Colon Cancer Screening  Urine Screening  Eye Exam  Hemoglobin A1c  Lipid Panel  Foot Exam    Pneumonia Vaccine  RSV Vaccine                  Health Maintenance Topic(s) Outreach Outcomes & Actions Taken:    Colorectal Cancer Screening - Outreach Outcomes & Actions Taken  : Colonoscopy Case Request / Referral / Home Test Order Placed and [employer] was given to patient on 8/6/2025 12:31 PM     Eye Exam - Outreach Outcomes & Actions Taken  :      Lab(s) - Outreach Outcomes & Actions Taken  :      Diabetic Foot Exam - Outreach Outcomes & Actions Taken  :         Additional Notes:  Patient reports taking diabetic medications. Patient will call back to schedule overdue/ due HM as have to arrange schedule with . Patient repeatedly expressed gratitude for call. Very pleasant.         Chronic Disease Management:     Diabetes Measures        Lab Results   Component Value Date    HGBA1C 6.7 (H) 07/26/2024           [x]  Reviewed chart for active Diabetes diagnosis     []  Scheduled necessary follow up appointments if needed             Provider Team Continuity:     Last PCP Visit Date: 4/30/2024          [x]  Reviewed Primary Care Provider Visits, Annual Wellness Visit, and Future          Appointments to ensure appointments have been scheduled and/or           completed        Additional Notes:  Patient verbalized he will schedule PCP appointment           Social Determinants of Health          [x]   Reviewed, completed, and/or updated the following sections:                  Food Insecurity, Transportation Needs, Financial Resource Strain,                 Tobacco Use          Care Management, Digital Medicine, and/or Education Referrals    OPCM Risk Score: 54.8         Next Steps - Referral Actions: Referral place for OPCM to [facility] for SDOH Food Insecurity & Transportation Needs if applicable  and Referral placed for Diabetes Education